# Patient Record
Sex: FEMALE | Race: WHITE | NOT HISPANIC OR LATINO | Employment: OTHER | URBAN - NONMETROPOLITAN AREA
[De-identification: names, ages, dates, MRNs, and addresses within clinical notes are randomized per-mention and may not be internally consistent; named-entity substitution may affect disease eponyms.]

---

## 2017-03-16 ENCOUNTER — OFFICE VISIT (OUTPATIENT)
Dept: RETAIL CLINIC | Facility: CLINIC | Age: 37
End: 2017-03-16

## 2017-03-16 VITALS
RESPIRATION RATE: 20 BRPM | SYSTOLIC BLOOD PRESSURE: 106 MMHG | TEMPERATURE: 98.9 F | HEART RATE: 102 BPM | DIASTOLIC BLOOD PRESSURE: 70 MMHG

## 2017-03-16 DIAGNOSIS — N30.01 ACUTE CYSTITIS WITH HEMATURIA: Primary | ICD-10-CM

## 2017-03-16 LAB
BILIRUB BLD-MCNC: NEGATIVE MG/DL
CLARITY, POC: CLEAR
COLOR UR: YELLOW
GLUCOSE UR STRIP-MCNC: ABNORMAL MG/DL
KETONES UR QL: ABNORMAL
LEUKOCYTE EST, POC: NEGATIVE
NITRITE UR-MCNC: NEGATIVE MG/ML
PH UR: 5 [PH] (ref 5–8)
PROT UR STRIP-MCNC: ABNORMAL MG/DL
RBC # UR STRIP: ABNORMAL /UL
SP GR UR: 1.01 (ref 1–1.03)
UROBILINOGEN UR QL: NORMAL

## 2017-03-16 PROCEDURE — 81002 URINALYSIS NONAUTO W/O SCOPE: CPT | Performed by: NURSE PRACTITIONER

## 2017-03-16 PROCEDURE — 99213 OFFICE O/P EST LOW 20 MIN: CPT | Performed by: NURSE PRACTITIONER

## 2017-03-16 RX ORDER — TRAMADOL HYDROCHLORIDE 50 MG/1
50 TABLET ORAL EVERY 6 HOURS PRN
COMMUNITY

## 2017-03-16 RX ORDER — CIPROFLOXACIN 250 MG/1
250 TABLET, FILM COATED ORAL 2 TIMES DAILY
Qty: 14 TABLET | Refills: 0 | Status: SHIPPED | OUTPATIENT
Start: 2017-03-16 | End: 2018-01-29

## 2017-03-16 NOTE — PATIENT INSTRUCTIONS
Increase fluids and rest.  If symptoms worsen or change, need to follow up with Urgent Care over the weekend.

## 2017-03-17 NOTE — PROGRESS NOTES
Subjective   Misa Lazcano is a 36 y.o. female here for UTI symptoms.     Urinary Tract Infection    This is a new problem. The current episode started today. The problem occurs every urination. The problem has been unchanged. The quality of the pain is described as burning (She is having pressure to urinate and burning with urination.  In addition, she has low to mid back pain bilaterally (aching)). The pain is mild. The maximum temperature recorded prior to her arrival was 100 - 100.9 F. The fever has been present for 3 - 4 days. Associated symptoms include chills, flank pain, frequency, hematuria, hesitancy, nausea and urgency. Pertinent negatives include no discharge, possible pregnancy, sweats or vomiting. Treatments tried: trying to push fluids. The treatment provided mild relief. There is no history of catheterization or recurrent UTIs.       The following portions of the patient's history were reviewed and updated as appropriate: allergies, current medications, past family history, past medical history, past social history, past surgical history and problem list.    Review of Systems   Constitutional: Positive for chills and fever (for a few days ). Negative for activity change.   HENT: Negative for congestion, mouth sores, nosebleeds, postnasal drip, rhinorrhea and sore throat.    Eyes: Negative for discharge and redness.   Respiratory: Negative for cough.    Gastrointestinal: Positive for abdominal pain (pressure over bladder) and nausea. Negative for blood in stool, constipation, diarrhea and vomiting.   Genitourinary: Positive for difficulty urinating, dysuria, flank pain, frequency, hematuria, hesitancy and urgency. Negative for vaginal bleeding and vaginal pain.        Hx of HAYLEY.   Musculoskeletal: Negative for arthralgias, joint swelling and myalgias.   Skin: Negative for rash.   Allergic/Immunologic: Negative for environmental allergies, food allergies and immunocompromised state.   Neurological:  Negative for seizures.   Hematological: Negative for adenopathy.       Objective   Physical Exam   Constitutional: She is oriented to person, place, and time. She appears well-developed and well-nourished. No distress.   HENT:   Head: Normocephalic and atraumatic.   Right Ear: External ear normal.   Left Ear: External ear normal.   Nose: Nose normal.   Mouth/Throat: Oropharynx is clear and moist. No oropharyngeal exudate.   Eyes: Conjunctivae and EOM are normal. Pupils are equal, round, and reactive to light. Right eye exhibits no discharge. Left eye exhibits no discharge. No scleral icterus.   Neck: Normal range of motion. Neck supple. No JVD present. No tracheal deviation present. No thyromegaly present.   Cardiovascular: Normal rate and regular rhythm.  Exam reveals no gallop and no friction rub.    No murmur heard.  Pulmonary/Chest: Effort normal and breath sounds normal. No stridor. No respiratory distress. She has no wheezes. She has no rales.   Abdominal: Soft. She exhibits no distension and no mass. There is tenderness (suprapubic tenderness and CVA tenderness bilaterally). There is no rebound and no guarding. No hernia.   Musculoskeletal: Normal range of motion. She exhibits no edema, tenderness or deformity.   Lymphadenopathy:     She has no cervical adenopathy.   Neurological: She is alert and oriented to person, place, and time. She exhibits normal muscle tone. Coordination normal.   Skin: Skin is warm and dry. No rash noted. She is not diaphoretic. No erythema. No pallor.   Psychiatric: She has a normal mood and affect. Her behavior is normal. Judgment and thought content normal.   Nursing note and vitals reviewed.      Assessment/Plan   Misa was seen today for urinary tract infection.    Diagnoses and all orders for this visit:    Acute cystitis with hematuria  -     ciprofloxacin (CIPRO) 250 MG tablet; Take 1 tablet by mouth 2 (Two) Times a Day.  -     POC Urinalysis Dipstick       Urine dipstick  was fairly unremarkable.  Reviewed recent blood glucose levels with her due to glucosuria with mild ketonuria.  She has been running 150-range with FBS and not over 200 non-fasting.  This is not a first-morning urine specimen, and due to the fact that she has classic UTI sx with CVA tenderness and fever, I am treating for UTI.  She understands to FU in 24 hours if symptoms are not improving, or go to Urgent Care/ER if symptoms worsen over the weekend.

## 2017-05-12 ENCOUNTER — APPOINTMENT (OUTPATIENT)
Dept: GENERAL RADIOLOGY | Age: 37
DRG: 638 | End: 2017-05-12
Payer: COMMERCIAL

## 2017-05-12 ENCOUNTER — HOSPITAL ENCOUNTER (INPATIENT)
Age: 37
LOS: 1 days | Discharge: HOME | DRG: 638 | End: 2017-05-13
Attending: EMERGENCY MEDICINE | Admitting: FAMILY MEDICINE
Payer: COMMERCIAL

## 2017-05-12 DIAGNOSIS — E10.10 DIABETIC KETOACIDOSIS WITHOUT COMA ASSOCIATED WITH TYPE 1 DIABETES MELLITUS (HCC): Primary | ICD-10-CM

## 2017-05-12 LAB
ACETONE BLOOD: ABNORMAL
ALBUMIN SERPL-MCNC: 4.5 G/DL (ref 3.5–5.2)
ALP BLD-CCNC: 68 U/L (ref 35–104)
ALT SERPL-CCNC: 9 U/L (ref 5–33)
ANION GAP SERPL CALCULATED.3IONS-SCNC: 13 MMOL/L (ref 7–19)
ANION GAP SERPL CALCULATED.3IONS-SCNC: 16 MMOL/L (ref 7–19)
ANION GAP SERPL CALCULATED.3IONS-SCNC: 18 MMOL/L (ref 7–19)
ANION GAP SERPL CALCULATED.3IONS-SCNC: 28 MMOL/L (ref 7–19)
AST SERPL-CCNC: 10 U/L (ref 5–32)
BASE EXCESS VENOUS: -9 MMOL/L
BASOPHILS ABSOLUTE: 0 K/UL (ref 0–0.2)
BASOPHILS RELATIVE PERCENT: 0.5 % (ref 0–1)
BILIRUB SERPL-MCNC: 0.8 MG/DL (ref 0.2–1.2)
BILIRUBIN URINE: NEGATIVE
BLOOD, URINE: NEGATIVE
BUN BLDV-MCNC: 13 MG/DL (ref 6–20)
BUN BLDV-MCNC: 16 MG/DL (ref 6–20)
BUN BLDV-MCNC: 16 MG/DL (ref 6–20)
BUN BLDV-MCNC: 8 MG/DL (ref 6–20)
CALCIUM SERPL-MCNC: 7.8 MG/DL (ref 8.6–10)
CALCIUM SERPL-MCNC: 8 MG/DL (ref 8.6–10)
CALCIUM SERPL-MCNC: 8.1 MG/DL (ref 8.6–10)
CALCIUM SERPL-MCNC: 9.3 MG/DL (ref 8.6–10)
CARBOXYHEMOGLOBIN: 5.5 %
CHLORIDE BLD-SCNC: 102 MMOL/L (ref 98–111)
CHLORIDE BLD-SCNC: 104 MMOL/L (ref 98–111)
CHLORIDE BLD-SCNC: 105 MMOL/L (ref 98–111)
CHLORIDE BLD-SCNC: 94 MMOL/L (ref 98–111)
CLARITY: CLEAR
CO2: 16 MMOL/L (ref 22–29)
CO2: 16 MMOL/L (ref 22–29)
CO2: 20 MMOL/L (ref 22–29)
CO2: 20 MMOL/L (ref 22–29)
COLOR: YELLOW
CREAT SERPL-MCNC: 0.5 MG/DL (ref 0.5–0.9)
CREAT SERPL-MCNC: 0.6 MG/DL (ref 0.5–0.9)
CREAT SERPL-MCNC: 0.6 MG/DL (ref 0.5–0.9)
CREAT SERPL-MCNC: 0.7 MG/DL (ref 0.5–0.9)
EOSINOPHILS ABSOLUTE: 0.1 K/UL (ref 0–0.6)
EOSINOPHILS RELATIVE PERCENT: 0.7 % (ref 0–5)
GFR NON-AFRICAN AMERICAN: >60
GLOBULIN: 3.1 G/DL
GLUCOSE BLD-MCNC: 109 MG/DL (ref 70–99)
GLUCOSE BLD-MCNC: 110 MG/DL (ref 70–99)
GLUCOSE BLD-MCNC: 110 MG/DL (ref 74–109)
GLUCOSE BLD-MCNC: 111 MG/DL (ref 70–99)
GLUCOSE BLD-MCNC: 115 MG/DL (ref 70–99)
GLUCOSE BLD-MCNC: 118 MG/DL (ref 74–109)
GLUCOSE BLD-MCNC: 130 MG/DL (ref 70–99)
GLUCOSE BLD-MCNC: 136 MG/DL (ref 70–99)
GLUCOSE BLD-MCNC: 142 MG/DL (ref 70–99)
GLUCOSE BLD-MCNC: 154 MG/DL (ref 70–99)
GLUCOSE BLD-MCNC: 163 MG/DL (ref 74–109)
GLUCOSE BLD-MCNC: 185 MG/DL (ref 70–99)
GLUCOSE BLD-MCNC: 190 MG/DL (ref 70–99)
GLUCOSE BLD-MCNC: 221 MG/DL (ref 70–99)
GLUCOSE BLD-MCNC: 241 MG/DL (ref 70–99)
GLUCOSE BLD-MCNC: 303 MG/DL (ref 70–99)
GLUCOSE BLD-MCNC: 342 MG/DL (ref 74–109)
GLUCOSE BLD-MCNC: 42 MG/DL (ref 70–99)
GLUCOSE BLD-MCNC: 53 MG/DL (ref 70–99)
GLUCOSE BLD-MCNC: 99 MG/DL (ref 70–99)
GLUCOSE URINE: >=1000 MG/DL
HCG QUALITATIVE: NEGATIVE
HCO3 VENOUS: 18 MMOL/L (ref 23–29)
HCT VFR BLD CALC: 34.8 % (ref 37–47)
HCT VFR BLD CALC: 44.4 % (ref 37–47)
HEMOGLOBIN: 12.2 G/DL (ref 12–16)
HEMOGLOBIN: 15.1 G/DL (ref 12–16)
KETONES, URINE: >=160 MG/DL
LEUKOCYTE ESTERASE, URINE: NEGATIVE
LYMPHOCYTES ABSOLUTE: 1 K/UL (ref 1.1–4.5)
LYMPHOCYTES RELATIVE PERCENT: 14.2 % (ref 20–40)
MAGNESIUM: 1.8 MG/DL (ref 1.6–2.6)
MCH RBC QN AUTO: 31 PG (ref 27–31)
MCH RBC QN AUTO: 31.4 PG (ref 27–31)
MCHC RBC AUTO-ENTMCNC: 34 G/DL (ref 33–37)
MCHC RBC AUTO-ENTMCNC: 35.1 G/DL (ref 33–37)
MCV RBC AUTO: 89.7 FL (ref 81–99)
MCV RBC AUTO: 91.2 FL (ref 81–99)
MONOCYTES ABSOLUTE: 0.3 K/UL (ref 0–0.9)
MONOCYTES RELATIVE PERCENT: 4.6 % (ref 0–10)
NEUTROPHILS ABSOLUTE: 5.8 K/UL (ref 1.5–7.5)
NEUTROPHILS RELATIVE PERCENT: 79.6 % (ref 50–65)
NITRITE, URINE: NEGATIVE
O2 CONTENT, VEN: 12 ML/DL
O2 SAT, VEN: 57 %
PCO2, VEN: 42 MMHG (ref 40–50)
PDW BLD-RTO: 12.3 % (ref 11.5–14.5)
PDW BLD-RTO: 12.4 % (ref 11.5–14.5)
PERFORMED ON: ABNORMAL
PERFORMED ON: NORMAL
PH UA: 5.5
PH VENOUS: 7.25 (ref 7.35–7.45)
PHOSPHORUS: 2.6 MG/DL (ref 2.5–4.5)
PLATELET # BLD: 251 K/UL (ref 130–400)
PLATELET # BLD: 267 K/UL (ref 130–400)
PMV BLD AUTO: 10.1 FL (ref 7.4–10.4)
PMV BLD AUTO: 9.2 FL (ref 7.4–10.4)
PO2, VEN: 31 MMHG
POTASSIUM SERPL-SCNC: 3.5 MMOL/L (ref 3.5–5)
POTASSIUM SERPL-SCNC: 3.6 MMOL/L (ref 3.5–5)
POTASSIUM SERPL-SCNC: 3.8 MMOL/L (ref 3.5–5)
POTASSIUM SERPL-SCNC: 5.1 MMOL/L (ref 3.5–5)
PROTEIN UA: NEGATIVE MG/DL
RBC # BLD: 3.88 M/UL (ref 4.2–5.4)
RBC # BLD: 4.87 M/UL (ref 4.2–5.4)
SODIUM BLD-SCNC: 136 MMOL/L (ref 136–145)
SODIUM BLD-SCNC: 138 MMOL/L (ref 136–145)
SODIUM BLD-SCNC: 138 MMOL/L (ref 136–145)
SODIUM BLD-SCNC: 140 MMOL/L (ref 136–145)
SPECIFIC GRAVITY UA: 1.03
TOTAL PROTEIN: 7.6 G/DL (ref 6.6–8.7)
UROBILINOGEN, URINE: 0.2 E.U./DL
WBC # BLD: 7.3 K/UL (ref 4.8–10.8)
WBC # BLD: 8.6 K/UL (ref 4.8–10.8)

## 2017-05-12 PROCEDURE — 99285 EMERGENCY DEPT VISIT HI MDM: CPT

## 2017-05-12 PROCEDURE — 2580000003 HC RX 258

## 2017-05-12 PROCEDURE — 2580000003 HC RX 258: Performed by: FAMILY MEDICINE

## 2017-05-12 PROCEDURE — 2580000003 HC RX 258: Performed by: NURSE PRACTITIONER

## 2017-05-12 PROCEDURE — 99285 EMERGENCY DEPT VISIT HI MDM: CPT | Performed by: EMERGENCY MEDICINE

## 2017-05-12 PROCEDURE — 36600 WITHDRAWAL OF ARTERIAL BLOOD: CPT

## 2017-05-12 PROCEDURE — 6360000002 HC RX W HCPCS: Performed by: NURSE PRACTITIONER

## 2017-05-12 PROCEDURE — 2000000000 HC ICU R&B

## 2017-05-12 PROCEDURE — 85025 COMPLETE CBC W/AUTO DIFF WBC: CPT

## 2017-05-12 PROCEDURE — 6370000000 HC RX 637 (ALT 250 FOR IP): Performed by: EMERGENCY MEDICINE

## 2017-05-12 PROCEDURE — 82948 REAGENT STRIP/BLOOD GLUCOSE: CPT

## 2017-05-12 PROCEDURE — 80048 BASIC METABOLIC PNL TOTAL CA: CPT

## 2017-05-12 PROCEDURE — 6370000000 HC RX 637 (ALT 250 FOR IP): Performed by: FAMILY MEDICINE

## 2017-05-12 PROCEDURE — 85027 COMPLETE CBC AUTOMATED: CPT

## 2017-05-12 PROCEDURE — 87070 CULTURE OTHR SPECIMN AEROBIC: CPT

## 2017-05-12 PROCEDURE — 87040 BLOOD CULTURE FOR BACTERIA: CPT

## 2017-05-12 PROCEDURE — 2580000003 HC RX 258: Performed by: EMERGENCY MEDICINE

## 2017-05-12 PROCEDURE — 96374 THER/PROPH/DIAG INJ IV PUSH: CPT

## 2017-05-12 PROCEDURE — 82803 BLOOD GASES ANY COMBINATION: CPT

## 2017-05-12 PROCEDURE — 84703 CHORIONIC GONADOTROPIN ASSAY: CPT

## 2017-05-12 PROCEDURE — 80053 COMPREHEN METABOLIC PANEL: CPT

## 2017-05-12 PROCEDURE — 36415 COLL VENOUS BLD VENIPUNCTURE: CPT

## 2017-05-12 PROCEDURE — 82009 KETONE BODYS QUAL: CPT

## 2017-05-12 PROCEDURE — 84100 ASSAY OF PHOSPHORUS: CPT

## 2017-05-12 PROCEDURE — 81003 URINALYSIS AUTO W/O SCOPE: CPT

## 2017-05-12 PROCEDURE — 71010 XR CHEST PORTABLE: CPT

## 2017-05-12 PROCEDURE — 6360000002 HC RX W HCPCS: Performed by: FAMILY MEDICINE

## 2017-05-12 PROCEDURE — 83735 ASSAY OF MAGNESIUM: CPT

## 2017-05-12 RX ORDER — SODIUM CHLORIDE 9 MG/ML
INJECTION, SOLUTION INTRAVENOUS CONTINUOUS
Status: DISCONTINUED | OUTPATIENT
Start: 2017-05-12 | End: 2017-05-13 | Stop reason: HOSPADM

## 2017-05-12 RX ORDER — ONDANSETRON 2 MG/ML
4 INJECTION INTRAMUSCULAR; INTRAVENOUS EVERY 6 HOURS PRN
Status: DISCONTINUED | OUTPATIENT
Start: 2017-05-12 | End: 2017-05-13 | Stop reason: HOSPADM

## 2017-05-12 RX ORDER — PROMETHAZINE HYDROCHLORIDE 25 MG/ML
12.5 INJECTION, SOLUTION INTRAMUSCULAR; INTRAVENOUS EVERY 6 HOURS PRN
Status: DISCONTINUED | OUTPATIENT
Start: 2017-05-12 | End: 2017-05-13 | Stop reason: HOSPADM

## 2017-05-12 RX ORDER — TRAMADOL HYDROCHLORIDE 50 MG/1
50 TABLET ORAL EVERY 6 HOURS PRN
Status: DISCONTINUED | OUTPATIENT
Start: 2017-05-12 | End: 2017-05-13 | Stop reason: HOSPADM

## 2017-05-12 RX ORDER — SODIUM CHLORIDE 0.9 % (FLUSH) 0.9 %
10 SYRINGE (ML) INJECTION EVERY 12 HOURS SCHEDULED
Status: DISCONTINUED | OUTPATIENT
Start: 2017-05-12 | End: 2017-05-13 | Stop reason: HOSPADM

## 2017-05-12 RX ORDER — DEXTROSE MONOHYDRATE 50 MG/ML
100 INJECTION, SOLUTION INTRAVENOUS PRN
Status: DISCONTINUED | OUTPATIENT
Start: 2017-05-12 | End: 2017-05-13 | Stop reason: HOSPADM

## 2017-05-12 RX ORDER — 0.9 % SODIUM CHLORIDE 0.9 %
1000 INTRAVENOUS SOLUTION INTRAVENOUS ONCE
Status: COMPLETED | OUTPATIENT
Start: 2017-05-12 | End: 2017-05-12

## 2017-05-12 RX ORDER — SODIUM CHLORIDE 0.9 % (FLUSH) 0.9 %
10 SYRINGE (ML) INJECTION PRN
Status: DISCONTINUED | OUTPATIENT
Start: 2017-05-12 | End: 2017-05-13 | Stop reason: HOSPADM

## 2017-05-12 RX ORDER — PROMETHAZINE HYDROCHLORIDE 25 MG/ML
12.5 INJECTION, SOLUTION INTRAMUSCULAR; INTRAVENOUS ONCE
Status: COMPLETED | OUTPATIENT
Start: 2017-05-12 | End: 2017-05-12

## 2017-05-12 RX ORDER — ACETAMINOPHEN 325 MG/1
650 TABLET ORAL EVERY 4 HOURS PRN
Status: DISCONTINUED | OUTPATIENT
Start: 2017-05-12 | End: 2017-05-13 | Stop reason: HOSPADM

## 2017-05-12 RX ORDER — CLONAZEPAM 1 MG/1
1 TABLET ORAL 3 TIMES DAILY
Status: DISCONTINUED | OUTPATIENT
Start: 2017-05-12 | End: 2017-05-13 | Stop reason: HOSPADM

## 2017-05-12 RX ORDER — NICOTINE POLACRILEX 4 MG
15 LOZENGE BUCCAL PRN
Status: DISCONTINUED | OUTPATIENT
Start: 2017-05-12 | End: 2017-05-13

## 2017-05-12 RX ORDER — DEXTROSE MONOHYDRATE 25 G/50ML
INJECTION, SOLUTION INTRAVENOUS
Status: COMPLETED
Start: 2017-05-12 | End: 2017-05-12

## 2017-05-12 RX ORDER — TRAMADOL HYDROCHLORIDE 50 MG/1
50 TABLET ORAL EVERY 6 HOURS PRN
COMMUNITY

## 2017-05-12 RX ORDER — DEXTROSE MONOHYDRATE 25 G/50ML
12.5 INJECTION, SOLUTION INTRAVENOUS PRN
Status: DISCONTINUED | OUTPATIENT
Start: 2017-05-12 | End: 2017-05-13 | Stop reason: HOSPADM

## 2017-05-12 RX ADMIN — SODIUM CHLORIDE 0.1 UNITS/KG/HR: 9 INJECTION, SOLUTION INTRAVENOUS at 09:18

## 2017-05-12 RX ADMIN — SODIUM CHLORIDE 1000 ML: 9 INJECTION, SOLUTION INTRAVENOUS at 07:36

## 2017-05-12 RX ADMIN — ACETAMINOPHEN 650 MG: 325 TABLET, FILM COATED ORAL at 21:30

## 2017-05-12 RX ADMIN — DEXTROSE MONOHYDRATE 100 ML/HR: 50 INJECTION, SOLUTION INTRAVENOUS at 21:15

## 2017-05-12 RX ADMIN — SODIUM CHLORIDE: 9 INJECTION, SOLUTION INTRAVENOUS at 10:41

## 2017-05-12 RX ADMIN — CLONAZEPAM 1 MG: 1 TABLET ORAL at 20:17

## 2017-05-12 RX ADMIN — DEXTROSE MONOHYDRATE 12.5 G: 25 INJECTION, SOLUTION INTRAVENOUS at 21:05

## 2017-05-12 RX ADMIN — PROMETHAZINE HYDROCHLORIDE 12.5 MG: 25 INJECTION, SOLUTION INTRAMUSCULAR; INTRAVENOUS at 17:58

## 2017-05-12 RX ADMIN — DEXTROSE MONOHYDRATE 50 ML: 25 INJECTION, SOLUTION INTRAVENOUS at 14:59

## 2017-05-12 RX ADMIN — PROMETHAZINE HYDROCHLORIDE 12.5 MG: 25 INJECTION INTRAMUSCULAR; INTRAVENOUS at 07:37

## 2017-05-12 RX ADMIN — TRAMADOL HYDROCHLORIDE 50 MG: 50 TABLET, FILM COATED ORAL at 20:17

## 2017-05-12 RX ADMIN — Medication 10 ML: at 10:41

## 2017-05-12 RX ADMIN — Medication 10 ML: at 21:30

## 2017-05-12 RX ADMIN — CLONAZEPAM 1 MG: 1 TABLET ORAL at 14:59

## 2017-05-12 RX ADMIN — SODIUM CHLORIDE 1000 ML: 9 INJECTION, SOLUTION INTRAVENOUS at 09:18

## 2017-05-12 RX ADMIN — ENOXAPARIN SODIUM 40 MG: 40 INJECTION SUBCUTANEOUS at 14:59

## 2017-05-12 ASSESSMENT — ENCOUNTER SYMPTOMS
VOMITING: 0
NAUSEA: 1
WHEEZING: 0
DIARRHEA: 1
SHORTNESS OF BREATH: 0
EYE DISCHARGE: 0
COUGH: 0
SORE THROAT: 0
BACK PAIN: 0
ABDOMINAL PAIN: 0

## 2017-05-12 ASSESSMENT — PAIN SCALES - GENERAL
PAINLEVEL_OUTOF10: 6
PAINLEVEL_OUTOF10: 2

## 2017-05-13 VITALS
RESPIRATION RATE: 24 BRPM | DIASTOLIC BLOOD PRESSURE: 69 MMHG | HEIGHT: 66 IN | BODY MASS INDEX: 15.46 KG/M2 | TEMPERATURE: 98.4 F | SYSTOLIC BLOOD PRESSURE: 108 MMHG | WEIGHT: 96.2 LBS | HEART RATE: 79 BPM | OXYGEN SATURATION: 99 %

## 2017-05-13 LAB
ANION GAP SERPL CALCULATED.3IONS-SCNC: 11 MMOL/L (ref 7–19)
BUN BLDV-MCNC: 6 MG/DL (ref 6–20)
CALCIUM SERPL-MCNC: 7.8 MG/DL (ref 8.6–10)
CHLORIDE BLD-SCNC: 103 MMOL/L (ref 98–111)
CO2: 20 MMOL/L (ref 22–29)
CREAT SERPL-MCNC: 0.5 MG/DL (ref 0.5–0.9)
GFR NON-AFRICAN AMERICAN: >60
GLUCOSE BLD-MCNC: 108 MG/DL (ref 70–99)
GLUCOSE BLD-MCNC: 126 MG/DL (ref 70–99)
GLUCOSE BLD-MCNC: 129 MG/DL (ref 70–99)
GLUCOSE BLD-MCNC: 141 MG/DL (ref 70–99)
GLUCOSE BLD-MCNC: 151 MG/DL (ref 70–99)
GLUCOSE BLD-MCNC: 157 MG/DL (ref 70–99)
GLUCOSE BLD-MCNC: 158 MG/DL (ref 70–99)
GLUCOSE BLD-MCNC: 159 MG/DL (ref 74–109)
GLUCOSE BLD-MCNC: 180 MG/DL (ref 70–99)
GLUCOSE BLD-MCNC: 331 MG/DL (ref 70–99)
HBA1C MFR BLD: 11 %
MRSA CULTURE ONLY: ABNORMAL
ORGANISM: ABNORMAL
PERFORMED ON: ABNORMAL
POTASSIUM SERPL-SCNC: 3.2 MMOL/L (ref 3.5–5)
SODIUM BLD-SCNC: 134 MMOL/L (ref 136–145)

## 2017-05-13 PROCEDURE — 80048 BASIC METABOLIC PNL TOTAL CA: CPT

## 2017-05-13 PROCEDURE — 6370000000 HC RX 637 (ALT 250 FOR IP): Performed by: FAMILY MEDICINE

## 2017-05-13 PROCEDURE — 2580000003 HC RX 258: Performed by: FAMILY MEDICINE

## 2017-05-13 PROCEDURE — 83036 HEMOGLOBIN GLYCOSYLATED A1C: CPT

## 2017-05-13 PROCEDURE — 6370000000 HC RX 637 (ALT 250 FOR IP): Performed by: INTERNAL MEDICINE

## 2017-05-13 PROCEDURE — 36415 COLL VENOUS BLD VENIPUNCTURE: CPT

## 2017-05-13 PROCEDURE — 82948 REAGENT STRIP/BLOOD GLUCOSE: CPT

## 2017-05-13 RX ORDER — NICOTINE POLACRILEX 4 MG
15 LOZENGE BUCCAL PRN
Status: DISCONTINUED | OUTPATIENT
Start: 2017-05-13 | End: 2017-05-13 | Stop reason: HOSPADM

## 2017-05-13 RX ORDER — DEXTROSE MONOHYDRATE 50 MG/ML
100 INJECTION, SOLUTION INTRAVENOUS PRN
Status: DISCONTINUED | OUTPATIENT
Start: 2017-05-13 | End: 2017-05-13 | Stop reason: HOSPADM

## 2017-05-13 RX ORDER — DEXTROSE MONOHYDRATE 25 G/50ML
12.5 INJECTION, SOLUTION INTRAVENOUS PRN
Status: DISCONTINUED | OUTPATIENT
Start: 2017-05-13 | End: 2017-05-13 | Stop reason: HOSPADM

## 2017-05-13 RX ORDER — INSULIN GLARGINE 100 [IU]/ML
30 INJECTION, SOLUTION SUBCUTANEOUS DAILY
Status: DISCONTINUED | OUTPATIENT
Start: 2017-05-13 | End: 2017-05-13 | Stop reason: HOSPADM

## 2017-05-13 RX ADMIN — TRAMADOL HYDROCHLORIDE 50 MG: 50 TABLET, FILM COATED ORAL at 08:19

## 2017-05-13 RX ADMIN — SODIUM CHLORIDE: 9 INJECTION, SOLUTION INTRAVENOUS at 08:24

## 2017-05-13 RX ADMIN — CLONAZEPAM 1 MG: 1 TABLET ORAL at 08:15

## 2017-05-13 RX ADMIN — INSULIN GLARGINE 30 UNITS: 100 INJECTION, SOLUTION SUBCUTANEOUS at 10:14

## 2017-05-13 RX ADMIN — INSULIN LISPRO 8 UNITS: 100 INJECTION, SOLUTION INTRAVENOUS; SUBCUTANEOUS at 12:32

## 2017-05-13 RX ADMIN — INSULIN LISPRO 4 UNITS: 100 INJECTION, SOLUTION INTRAVENOUS; SUBCUTANEOUS at 12:31

## 2017-05-13 ASSESSMENT — PAIN SCALES - GENERAL
PAINLEVEL_OUTOF10: 2
PAINLEVEL_OUTOF10: 5

## 2017-05-18 LAB
BLOOD CULTURE, ROUTINE: NORMAL
CULTURE, BLOOD 2: NORMAL

## 2017-10-12 ENCOUNTER — TELEPHONE (OUTPATIENT)
Dept: NEUROLOGY | Age: 37
End: 2017-10-12

## 2018-01-29 ENCOUNTER — OFFICE VISIT (OUTPATIENT)
Dept: RETAIL CLINIC | Facility: CLINIC | Age: 38
End: 2018-01-29

## 2018-01-29 VITALS
RESPIRATION RATE: 20 BRPM | SYSTOLIC BLOOD PRESSURE: 109 MMHG | DIASTOLIC BLOOD PRESSURE: 80 MMHG | HEART RATE: 95 BPM | TEMPERATURE: 98.7 F

## 2018-01-29 DIAGNOSIS — B02.9 HERPES ZOSTER WITHOUT COMPLICATION: Primary | ICD-10-CM

## 2018-01-29 PROCEDURE — 99213 OFFICE O/P EST LOW 20 MIN: CPT | Performed by: NURSE PRACTITIONER

## 2018-01-29 RX ORDER — FAMCICLOVIR 500 MG/1
500 TABLET ORAL 3 TIMES DAILY
Qty: 21 TABLET | Refills: 0 | Status: SHIPPED | OUTPATIENT
Start: 2018-01-29 | End: 2018-02-05

## 2018-01-29 RX ORDER — ONDANSETRON 8 MG/1
8 TABLET, ORALLY DISINTEGRATING ORAL EVERY 8 HOURS PRN
Qty: 12 TABLET | Refills: 0 | Status: SHIPPED | OUTPATIENT
Start: 2018-01-29 | End: 2020-12-02

## 2018-01-29 NOTE — PROGRESS NOTES
Subjective   Misa Martin is a 37 y.o. female here for rash.     Rash   This is a new problem. The current episode started yesterday. The problem has been rapidly worsening since onset. Location: right flank. The rash is characterized by redness, burning and itchiness (bumps). She was exposed to nothing. Associated symptoms include coughing (slight). Pertinent negatives include no anorexia, congestion, diarrhea, eye pain, facial edema, fatigue, fever, joint pain, nail changes, rhinorrhea, shortness of breath, sore throat or vomiting. Past treatments include nothing. Her past medical history is significant for varicella. There is no history of allergies, asthma or eczema.       The following portions of the patient's history were reviewed and updated as appropriate: allergies, current medications, past family history, past medical history, past social history, past surgical history and problem list.    Review of Systems   Constitutional: Negative for activity change, appetite change, fatigue and fever.   HENT: Negative for congestion, rhinorrhea and sore throat.    Eyes: Negative for pain and discharge.   Respiratory: Positive for cough (slight). Negative for chest tightness and shortness of breath.    Gastrointestinal: Positive for abdominal pain and nausea. Negative for anorexia, diarrhea and vomiting.   Genitourinary: Negative for dysuria.   Musculoskeletal: Positive for back pain (hurts mid-back all along side, to RUQ abdomen) and neck pain (feels achy). Negative for arthralgias, joint pain and joint swelling.   Skin: Positive for rash. Negative for color change, nail changes and pallor.   Allergic/Immunologic: Negative for environmental allergies, food allergies and immunocompromised state.   Neurological: Negative for seizures.   Hematological: Negative for adenopathy.       Objective   Physical Exam   Constitutional: She is oriented to person, place, and time. She appears well-developed and well-nourished. No  distress.   HENT:   Head: Normocephalic and atraumatic.   Right Ear: External ear normal.   Left Ear: External ear normal.   Nose: Nose normal.   Mouth/Throat: Oropharynx is clear and moist. No oropharyngeal exudate.   Eyes: Conjunctivae and EOM are normal. Pupils are equal, round, and reactive to light. Right eye exhibits no discharge. Left eye exhibits no discharge. No scleral icterus.   Neck: Normal range of motion. Neck supple. No JVD present. No tracheal deviation present. No thyromegaly present.   Cardiovascular: Normal rate and regular rhythm.  Exam reveals no gallop and no friction rub.    No murmur heard.  Pulmonary/Chest: Effort normal and breath sounds normal. No stridor. No respiratory distress. She has no wheezes. She has no rales.   Abdominal: Soft. She exhibits no distension and no mass. There is tenderness (RUQ and rib cage). There is no rebound and no guarding. No hernia.   Musculoskeletal: She exhibits no edema, tenderness or deformity.   Lymphadenopathy:     She has no cervical adenopathy.   Neurological: She is alert and oriented to person, place, and time. She exhibits normal muscle tone. Coordination normal.   Skin: Skin is warm and dry. Rash (cluster of papules on erythematous base at level of last rib right flank region) noted. She is not diaphoretic. No erythema. No pallor.   Psychiatric: She has a normal mood and affect. Her behavior is normal. Judgment and thought content normal.   Nursing note and vitals reviewed.      Assessment/Plan   Misa was seen today for rash.    Diagnoses and all orders for this visit:    Herpes zoster without complication    Other orders  -     famciclovir (FAMVIR) 500 MG tablet; Take 1 tablet by mouth 3 (Three) Times a Day for 7 days.  -     ondansetron ODT (ZOFRAN ODT) 8 MG disintegrating tablet; Take 1 tablet by mouth Every 8 (Eight) Hours As Needed for Nausea or Vomiting.

## 2018-02-26 ENCOUNTER — HOSPITAL ENCOUNTER (EMERGENCY)
Age: 38
Discharge: HOME | End: 2018-02-26
Payer: MEDICARE

## 2018-02-26 ENCOUNTER — APPOINTMENT (OUTPATIENT)
Dept: GENERAL RADIOLOGY | Age: 38
End: 2018-02-26
Payer: MEDICARE

## 2018-02-26 VITALS
BODY MASS INDEX: 16.07 KG/M2 | RESPIRATION RATE: 14 BRPM | OXYGEN SATURATION: 97 % | DIASTOLIC BLOOD PRESSURE: 79 MMHG | WEIGHT: 100 LBS | HEART RATE: 89 BPM | SYSTOLIC BLOOD PRESSURE: 111 MMHG | HEIGHT: 66 IN | TEMPERATURE: 98 F

## 2018-02-26 DIAGNOSIS — S29.011A MUSCLE STRAIN OF CHEST WALL, INITIAL ENCOUNTER: Primary | ICD-10-CM

## 2018-02-26 PROCEDURE — 6360000002 HC RX W HCPCS

## 2018-02-26 PROCEDURE — 6360000002 HC RX W HCPCS: Performed by: NURSE PRACTITIONER

## 2018-02-26 PROCEDURE — 71046 X-RAY EXAM CHEST 2 VIEWS: CPT

## 2018-02-26 PROCEDURE — 96372 THER/PROPH/DIAG INJ SC/IM: CPT

## 2018-02-26 PROCEDURE — 99283 EMERGENCY DEPT VISIT LOW MDM: CPT | Performed by: NURSE PRACTITIONER

## 2018-02-26 PROCEDURE — 99284 EMERGENCY DEPT VISIT MOD MDM: CPT

## 2018-02-26 RX ORDER — KETOROLAC TROMETHAMINE 30 MG/ML
30 INJECTION, SOLUTION INTRAMUSCULAR; INTRAVENOUS ONCE
Status: COMPLETED | OUTPATIENT
Start: 2018-02-26 | End: 2018-02-26

## 2018-02-26 RX ORDER — NAPROXEN 500 MG/1
500 TABLET ORAL 2 TIMES DAILY
Qty: 20 TABLET | Refills: 0 | Status: SHIPPED | OUTPATIENT
Start: 2018-02-26 | End: 2019-09-23 | Stop reason: ALTCHOICE

## 2018-02-26 RX ORDER — ORPHENADRINE CITRATE 30 MG/ML
60 INJECTION INTRAMUSCULAR; INTRAVENOUS ONCE
Status: COMPLETED | OUTPATIENT
Start: 2018-02-26 | End: 2018-02-26

## 2018-02-26 RX ORDER — ONDANSETRON 4 MG/1
4 TABLET, ORALLY DISINTEGRATING ORAL ONCE
Status: COMPLETED | OUTPATIENT
Start: 2018-02-26 | End: 2018-02-26

## 2018-02-26 RX ORDER — CYCLOBENZAPRINE HCL 10 MG
10 TABLET ORAL 3 TIMES DAILY PRN
Qty: 20 TABLET | Refills: 0 | Status: SHIPPED | OUTPATIENT
Start: 2018-02-26 | End: 2019-09-23 | Stop reason: ALTCHOICE

## 2018-02-26 RX ORDER — ONDANSETRON 4 MG/1
TABLET, ORALLY DISINTEGRATING ORAL
Status: COMPLETED
Start: 2018-02-26 | End: 2018-02-26

## 2018-02-26 RX ADMIN — ORPHENADRINE CITRATE 60 MG: 30 INJECTION INTRAMUSCULAR; INTRAVENOUS at 08:44

## 2018-02-26 RX ADMIN — ONDANSETRON 4 MG: 4 TABLET, ORALLY DISINTEGRATING ORAL at 08:43

## 2018-02-26 RX ADMIN — KETOROLAC TROMETHAMINE 30 MG: 30 INJECTION, SOLUTION INTRAMUSCULAR at 08:44

## 2018-02-26 ASSESSMENT — ENCOUNTER SYMPTOMS
RESPIRATORY NEGATIVE: 1
GASTROINTESTINAL NEGATIVE: 1

## 2018-02-26 ASSESSMENT — PAIN SCALES - GENERAL: PAINLEVEL_OUTOF10: 10

## 2018-02-26 NOTE — ED PROVIDER NOTES
GLARGINE 300 UNIT/ML SOPN    Inject 24 Units into the skin every morning Indications: TOUJEO     INSULIN LISPRO (HUMALOG) 100 UNIT/ML INJECTION VIAL    Inject 0-6 Units into the skin 3 times daily (with meals)    INSULIN LISPRO (HUMALOG) 100 UNIT/ML INJECTION VIAL    Inject 0-3 Units into the skin nightly    INSULIN LISPRO (HUMALOG) 100 UNIT/ML INJECTION VIAL    Inject 4 Units into the skin 3 times daily (before meals) Indications: sliding scale - USUALLY 4 UNITS    TRAMADOL (ULTRAM) 50 MG TABLET    Take 50 mg by mouth every 6 hours as needed for Pain Indications: Pain       ALLERGIES     Ceclor [cefaclor]; Keflex [cephalexin]; and Penicillins    FAMILY HISTORY       Family History   Problem Relation Age of Onset    Cancer Paternal Aunt     Cancer Maternal Grandmother     Cancer Paternal Grandmother           SOCIAL HISTORY       Social History     Social History    Marital status:      Spouse name: N/A    Number of children: N/A    Years of education: N/A     Social History Main Topics    Smoking status: Current Every Day Smoker     Packs/day: 1.00     Years: 20.00     Types: Cigarettes    Smokeless tobacco: None    Alcohol use No    Drug use: No    Sexual activity: Yes     Partners: Male     Other Topics Concern    None     Social History Narrative    None       SCREENINGS             PHYSICAL EXAM    (up to 7 for level 4, 8 or more for level 5)     ED Triage Vitals [02/26/18 0736]   BP Temp Temp src Pulse Resp SpO2 Height Weight   111/79 98 °F (36.7 °C) -- 89 14 97 % 5' 6\" (1.676 m) 100 lb (45.4 kg)       Physical Exam   Constitutional: She is oriented to person, place, and time. She appears well-nourished. HENT:   Head: Normocephalic. Neck: Normal range of motion. Cardiovascular: Normal rate, regular rhythm, normal heart sounds and intact distal pulses.     Pulmonary/Chest: Effort normal and breath sounds normal. She exhibits tenderness (right upper anterior chest reproducing patient's known as:  FLEXERIL  Take 1 tablet by mouth 3 times daily as needed for Muscle spasms     naproxen 500 MG tablet  Commonly known as:  NAPROSYN  Take 1 tablet by mouth 2 times daily        STOP taking these medications    hydrOXYzine 10 MG tablet  Commonly known as:  ATARAX     ondansetron 4 MG disintegrating tablet  Commonly known as:  ZOFRAN ODT     ondansetron 4 MG tablet  Commonly known as:  ZOFRAN        ASK your doctor about these medications    B-D ULTRAFINE III SHORT PEN 31G X 8 MM Misc  Generic drug:  Insulin Pen Needle     clonazePAM 1 MG tablet  Commonly known as:  KLONOPIN     dicyclomine 10 MG capsule  Commonly known as:  BENTYL     insulin glargine 300 UNIT/ML injection pen  Commonly known as:  TOUJEO SOLOSTAR     * insulin lispro 100 UNIT/ML injection vial  Commonly known as:  HUMALOG  Inject 0-6 Units into the skin 3 times daily (with meals)     * insulin lispro 100 UNIT/ML injection vial  Commonly known as:  HUMALOG  Inject 0-3 Units into the skin nightly     * insulin lispro 100 UNIT/ML injection vial  Commonly known as:  HUMALOG  Inject 4 Units into the skin 3 times daily (before meals) Indications: sliding scale - USUALLY 4 UNITS     traMADol 50 MG tablet  Commonly known as:  ULTRAM        * This list has 3 medication(s) that are the same as other medications prescribed for you. Read the directions carefully, and ask your doctor or other care provider to review them with you.                Where to Get Your Medications      You can get these medications from any pharmacy    Bring a paper prescription for each of these medications  · cyclobenzaprine 10 MG tablet  · naproxen 500 MG tablet           (Please note that portions of this note were completed with a voice recognition program.  Efforts were made to edit the dictations but occasionally words are mis-transcribed.)              Ayan Tao, APRN  02/26/18 [id number]

## 2018-08-08 ENCOUNTER — HOSPITAL ENCOUNTER (OUTPATIENT)
Dept: WOMENS IMAGING | Age: 38
Discharge: HOME | End: 2018-08-08
Payer: MEDICARE

## 2018-08-08 DIAGNOSIS — Z12.31 VISIT FOR SCREENING MAMMOGRAM: ICD-10-CM

## 2018-08-08 PROCEDURE — 77067 SCR MAMMO BI INCL CAD: CPT

## 2018-08-17 ENCOUNTER — ANESTHESIA (OUTPATIENT)
Dept: PERIOP | Facility: HOSPITAL | Age: 38
End: 2018-08-17

## 2018-08-17 ENCOUNTER — ANESTHESIA EVENT (OUTPATIENT)
Dept: PERIOP | Facility: HOSPITAL | Age: 38
End: 2018-08-17

## 2018-08-17 ENCOUNTER — HOSPITAL ENCOUNTER (OUTPATIENT)
Facility: HOSPITAL | Age: 38
Discharge: HOME | End: 2018-08-18
Attending: FAMILY MEDICINE | Admitting: FAMILY MEDICINE

## 2018-08-17 DIAGNOSIS — L02.91 ABSCESS: ICD-10-CM

## 2018-08-17 LAB
ALBUMIN SERPL-MCNC: 4.3 G/DL (ref 3.5–5)
ALBUMIN/GLOB SERPL: 1.7 G/DL (ref 1.1–2.5)
ALP SERPL-CCNC: 82 U/L (ref 24–120)
ALT SERPL W P-5'-P-CCNC: 21 U/L (ref 0–54)
ANION GAP SERPL CALCULATED.3IONS-SCNC: 10 MMOL/L (ref 4–13)
APTT PPP: 26.3 SECONDS (ref 24.1–34.8)
AST SERPL-CCNC: 16 U/L (ref 7–45)
BILIRUB SERPL-MCNC: 0.6 MG/DL (ref 0.1–1)
BUN BLD-MCNC: 9 MG/DL (ref 5–21)
BUN/CREAT SERPL: 15.3 (ref 7–25)
CALCIUM SPEC-SCNC: 9.1 MG/DL (ref 8.4–10.4)
CHLORIDE SERPL-SCNC: 100 MMOL/L (ref 98–110)
CO2 SERPL-SCNC: 28 MMOL/L (ref 24–31)
CREAT BLD-MCNC: 0.59 MG/DL (ref 0.5–1.4)
DEPRECATED RDW RBC AUTO: 36.6 FL (ref 40–54)
ERYTHROCYTE [DISTWIDTH] IN BLOOD BY AUTOMATED COUNT: 11.7 % (ref 12–15)
GFR SERPL CREATININE-BSD FRML MDRD: 113 ML/MIN/1.73
GLOBULIN UR ELPH-MCNC: 2.6 GM/DL
GLUCOSE BLD-MCNC: 398 MG/DL (ref 70–100)
GLUCOSE BLDC GLUCOMTR-MCNC: 139 MG/DL (ref 70–130)
GLUCOSE BLDC GLUCOMTR-MCNC: 146 MG/DL (ref 70–130)
GLUCOSE BLDC GLUCOMTR-MCNC: 173 MG/DL (ref 70–130)
GLUCOSE BLDC GLUCOMTR-MCNC: 369 MG/DL (ref 70–130)
GLUCOSE BLDC GLUCOMTR-MCNC: 58 MG/DL (ref 70–130)
GLUCOSE BLDC GLUCOMTR-MCNC: 65 MG/DL (ref 70–130)
GLUCOSE BLDC GLUCOMTR-MCNC: 82 MG/DL (ref 70–130)
GLUCOSE BLDC GLUCOMTR-MCNC: 96 MG/DL (ref 70–130)
HCT VFR BLD AUTO: 35.6 % (ref 37–47)
HGB BLD-MCNC: 12.5 G/DL (ref 12–16)
INR PPP: 0.91 (ref 0.91–1.09)
MCH RBC QN AUTO: 30.1 PG (ref 28–32)
MCHC RBC AUTO-ENTMCNC: 35.1 G/DL (ref 33–36)
MCV RBC AUTO: 85.8 FL (ref 82–98)
PLATELET # BLD AUTO: 275 10*3/MM3 (ref 130–400)
PMV BLD AUTO: 9.7 FL (ref 6–12)
POTASSIUM BLD-SCNC: 4.1 MMOL/L (ref 3.5–5.3)
PROT SERPL-MCNC: 6.9 G/DL (ref 6.3–8.7)
PROTHROMBIN TIME: 12.5 SECONDS (ref 11.9–14.6)
RBC # BLD AUTO: 4.15 10*6/MM3 (ref 4.2–5.4)
SODIUM BLD-SCNC: 138 MMOL/L (ref 135–145)
WBC NRBC COR # BLD: 9.38 10*3/MM3 (ref 4.8–10.8)

## 2018-08-17 PROCEDURE — 25010000002 MORPHINE SULFATE (PF) 2 MG/ML SOLUTION: Performed by: ANESTHESIOLOGY

## 2018-08-17 PROCEDURE — 96375 TX/PRO/DX INJ NEW DRUG ADDON: CPT

## 2018-08-17 PROCEDURE — 87147 CULTURE TYPE IMMUNOLOGIC: CPT | Performed by: SPECIALIST

## 2018-08-17 PROCEDURE — G0378 HOSPITAL OBSERVATION PER HR: HCPCS

## 2018-08-17 PROCEDURE — 63710000001 INSULIN LISPRO (HUMAN) PER 5 UNITS: Performed by: FAMILY MEDICINE

## 2018-08-17 PROCEDURE — 85610 PROTHROMBIN TIME: CPT | Performed by: FAMILY MEDICINE

## 2018-08-17 PROCEDURE — 25010000002 ONDANSETRON PER 1 MG: Performed by: ANESTHESIOLOGY

## 2018-08-17 PROCEDURE — 87070 CULTURE OTHR SPECIMN AEROBIC: CPT | Performed by: SPECIALIST

## 2018-08-17 PROCEDURE — 25010000002 SUCCINYLCHOLINE PER 20 MG: Performed by: NURSE ANESTHETIST, CERTIFIED REGISTERED

## 2018-08-17 PROCEDURE — 85730 THROMBOPLASTIN TIME PARTIAL: CPT | Performed by: FAMILY MEDICINE

## 2018-08-17 PROCEDURE — 25010000002 MIDAZOLAM PER 1 MG: Performed by: ANESTHESIOLOGY

## 2018-08-17 PROCEDURE — 88307 TISSUE EXAM BY PATHOLOGIST: CPT | Performed by: SPECIALIST

## 2018-08-17 PROCEDURE — 25010000002 FENTANYL CITRATE (PF) 100 MCG/2ML SOLUTION: Performed by: NURSE ANESTHETIST, CERTIFIED REGISTERED

## 2018-08-17 PROCEDURE — 25010000002 DEXAMETHASONE PER 1 MG: Performed by: ANESTHESIOLOGY

## 2018-08-17 PROCEDURE — 85027 COMPLETE CBC AUTOMATED: CPT | Performed by: FAMILY MEDICINE

## 2018-08-17 PROCEDURE — 25010000002 ONDANSETRON PER 1 MG: Performed by: FAMILY MEDICINE

## 2018-08-17 PROCEDURE — 25010000002 PROPOFOL 10 MG/ML EMULSION: Performed by: NURSE ANESTHETIST, CERTIFIED REGISTERED

## 2018-08-17 PROCEDURE — 82962 GLUCOSE BLOOD TEST: CPT

## 2018-08-17 PROCEDURE — 25010000002 VANCOMYCIN PER 500 MG: Performed by: SPECIALIST

## 2018-08-17 PROCEDURE — 25010000002 ONDANSETRON PER 1 MG: Performed by: NURSE ANESTHETIST, CERTIFIED REGISTERED

## 2018-08-17 PROCEDURE — 80053 COMPREHEN METABOLIC PANEL: CPT | Performed by: FAMILY MEDICINE

## 2018-08-17 PROCEDURE — 96374 THER/PROPH/DIAG INJ IV PUSH: CPT

## 2018-08-17 PROCEDURE — 87205 SMEAR GRAM STAIN: CPT | Performed by: SPECIALIST

## 2018-08-17 RX ORDER — LABETALOL HYDROCHLORIDE 5 MG/ML
5 INJECTION, SOLUTION INTRAVENOUS
Status: DISCONTINUED | OUTPATIENT
Start: 2018-08-17 | End: 2018-08-17 | Stop reason: HOSPADM

## 2018-08-17 RX ORDER — ACETAMINOPHEN 500 MG
1000 TABLET ORAL ONCE
Status: COMPLETED | OUTPATIENT
Start: 2018-08-17 | End: 2018-08-17

## 2018-08-17 RX ORDER — DEXTROSE MONOHYDRATE 25 G/50ML
25 INJECTION, SOLUTION INTRAVENOUS
Status: DISCONTINUED | OUTPATIENT
Start: 2018-08-17 | End: 2018-08-18 | Stop reason: SDUPTHER

## 2018-08-17 RX ORDER — ONDANSETRON 8 MG/1
8 TABLET, ORALLY DISINTEGRATING ORAL EVERY 6 HOURS PRN
Status: DISCONTINUED | OUTPATIENT
Start: 2018-08-17 | End: 2018-08-18 | Stop reason: HOSPADM

## 2018-08-17 RX ORDER — NALOXONE HCL 0.4 MG/ML
0.04 VIAL (ML) INJECTION AS NEEDED
Status: DISCONTINUED | OUTPATIENT
Start: 2018-08-17 | End: 2018-08-17 | Stop reason: HOSPADM

## 2018-08-17 RX ORDER — IPRATROPIUM BROMIDE AND ALBUTEROL SULFATE 2.5; .5 MG/3ML; MG/3ML
3 SOLUTION RESPIRATORY (INHALATION) ONCE AS NEEDED
Status: DISCONTINUED | OUTPATIENT
Start: 2018-08-17 | End: 2018-08-17 | Stop reason: HOSPADM

## 2018-08-17 RX ORDER — LIDOCAINE HYDROCHLORIDE 20 MG/ML
INJECTION, SOLUTION INFILTRATION; PERINEURAL AS NEEDED
Status: DISCONTINUED | OUTPATIENT
Start: 2018-08-17 | End: 2018-08-17 | Stop reason: SURG

## 2018-08-17 RX ORDER — NICOTINE POLACRILEX 4 MG
15 LOZENGE BUCCAL
Status: DISCONTINUED | OUTPATIENT
Start: 2018-08-17 | End: 2018-08-18 | Stop reason: SDUPTHER

## 2018-08-17 RX ORDER — SIMETHICONE 80 MG
80 TABLET,CHEWABLE ORAL 4 TIMES DAILY PRN
Status: DISCONTINUED | OUTPATIENT
Start: 2018-08-17 | End: 2018-08-18 | Stop reason: HOSPADM

## 2018-08-17 RX ORDER — SUCCINYLCHOLINE CHLORIDE 20 MG/ML
INJECTION INTRAMUSCULAR; INTRAVENOUS AS NEEDED
Status: DISCONTINUED | OUTPATIENT
Start: 2018-08-17 | End: 2018-08-17 | Stop reason: SURG

## 2018-08-17 RX ORDER — FAMOTIDINE 10 MG/ML
20 INJECTION, SOLUTION INTRAVENOUS EVERY 12 HOURS SCHEDULED
Status: DISCONTINUED | OUTPATIENT
Start: 2018-08-17 | End: 2018-08-18 | Stop reason: HOSPADM

## 2018-08-17 RX ORDER — HYDROCODONE BITARTRATE AND ACETAMINOPHEN 7.5; 325 MG/1; MG/1
1 TABLET ORAL EVERY 4 HOURS PRN
Status: DISCONTINUED | OUTPATIENT
Start: 2018-08-17 | End: 2018-08-18 | Stop reason: HOSPADM

## 2018-08-17 RX ORDER — OXYCODONE AND ACETAMINOPHEN 10; 325 MG/1; MG/1
1 TABLET ORAL ONCE AS NEEDED
Status: DISCONTINUED | OUTPATIENT
Start: 2018-08-17 | End: 2018-08-17 | Stop reason: HOSPADM

## 2018-08-17 RX ORDER — LIDOCAINE HYDROCHLORIDE 40 MG/ML
SOLUTION TOPICAL AS NEEDED
Status: DISCONTINUED | OUTPATIENT
Start: 2018-08-17 | End: 2018-08-17 | Stop reason: SURG

## 2018-08-17 RX ORDER — FLUMAZENIL 0.1 MG/ML
0.2 INJECTION INTRAVENOUS AS NEEDED
Status: DISCONTINUED | OUTPATIENT
Start: 2018-08-17 | End: 2018-08-17 | Stop reason: HOSPADM

## 2018-08-17 RX ORDER — SODIUM CHLORIDE 9 MG/ML
125 INJECTION, SOLUTION INTRAVENOUS CONTINUOUS
Status: DISCONTINUED | OUTPATIENT
Start: 2018-08-17 | End: 2018-08-18 | Stop reason: HOSPADM

## 2018-08-17 RX ORDER — SUCRALFATE ORAL 1 G/10ML
1 SUSPENSION ORAL EVERY 6 HOURS SCHEDULED
Status: DISCONTINUED | OUTPATIENT
Start: 2018-08-17 | End: 2018-08-18 | Stop reason: HOSPADM

## 2018-08-17 RX ORDER — MEPERIDINE HYDROCHLORIDE 25 MG/ML
12.5 INJECTION INTRAMUSCULAR; INTRAVENOUS; SUBCUTANEOUS
Status: DISCONTINUED | OUTPATIENT
Start: 2018-08-17 | End: 2018-08-17 | Stop reason: HOSPADM

## 2018-08-17 RX ORDER — MIDAZOLAM HYDROCHLORIDE 1 MG/ML
2 INJECTION INTRAMUSCULAR; INTRAVENOUS
Status: DISCONTINUED | OUTPATIENT
Start: 2018-08-17 | End: 2018-08-17 | Stop reason: HOSPADM

## 2018-08-17 RX ORDER — DEXAMETHASONE SODIUM PHOSPHATE 4 MG/ML
4 INJECTION, SOLUTION INTRA-ARTICULAR; INTRALESIONAL; INTRAMUSCULAR; INTRAVENOUS; SOFT TISSUE ONCE AS NEEDED
Status: COMPLETED | OUTPATIENT
Start: 2018-08-17 | End: 2018-08-17

## 2018-08-17 RX ORDER — FAMOTIDINE 20 MG/1
20 TABLET, FILM COATED ORAL 2 TIMES DAILY
Status: DISCONTINUED | OUTPATIENT
Start: 2018-08-17 | End: 2018-08-18 | Stop reason: HOSPADM

## 2018-08-17 RX ORDER — CLONAZEPAM 1 MG/1
1 TABLET ORAL DAILY
Status: DISCONTINUED | OUTPATIENT
Start: 2018-08-17 | End: 2018-08-18 | Stop reason: HOSPADM

## 2018-08-17 RX ORDER — HYDRALAZINE HYDROCHLORIDE 20 MG/ML
5 INJECTION INTRAMUSCULAR; INTRAVENOUS
Status: DISCONTINUED | OUTPATIENT
Start: 2018-08-17 | End: 2018-08-17 | Stop reason: HOSPADM

## 2018-08-17 RX ORDER — FAMOTIDINE 20 MG/1
20 TABLET, FILM COATED ORAL EVERY 12 HOURS SCHEDULED
Status: DISCONTINUED | OUTPATIENT
Start: 2018-08-17 | End: 2018-08-18 | Stop reason: HOSPADM

## 2018-08-17 RX ORDER — ROCURONIUM BROMIDE 10 MG/ML
INJECTION, SOLUTION INTRAVENOUS AS NEEDED
Status: DISCONTINUED | OUTPATIENT
Start: 2018-08-17 | End: 2018-08-17 | Stop reason: SURG

## 2018-08-17 RX ORDER — SODIUM CHLORIDE, SODIUM LACTATE, POTASSIUM CHLORIDE, CALCIUM CHLORIDE 600; 310; 30; 20 MG/100ML; MG/100ML; MG/100ML; MG/100ML
100 INJECTION, SOLUTION INTRAVENOUS CONTINUOUS
Status: DISCONTINUED | OUTPATIENT
Start: 2018-08-17 | End: 2018-08-18

## 2018-08-17 RX ORDER — SODIUM CHLORIDE 0.9 % (FLUSH) 0.9 %
1-10 SYRINGE (ML) INJECTION AS NEEDED
Status: DISCONTINUED | OUTPATIENT
Start: 2018-08-17 | End: 2018-08-17 | Stop reason: HOSPADM

## 2018-08-17 RX ORDER — FENTANYL CITRATE 50 UG/ML
INJECTION, SOLUTION INTRAMUSCULAR; INTRAVENOUS AS NEEDED
Status: DISCONTINUED | OUTPATIENT
Start: 2018-08-17 | End: 2018-08-17 | Stop reason: SURG

## 2018-08-17 RX ORDER — METOCLOPRAMIDE HYDROCHLORIDE 5 MG/ML
5 INJECTION INTRAMUSCULAR; INTRAVENOUS
Status: DISCONTINUED | OUTPATIENT
Start: 2018-08-17 | End: 2018-08-17 | Stop reason: HOSPADM

## 2018-08-17 RX ORDER — ONDANSETRON 2 MG/ML
4 INJECTION INTRAMUSCULAR; INTRAVENOUS AS NEEDED
Status: DISCONTINUED | OUTPATIENT
Start: 2018-08-17 | End: 2018-08-17 | Stop reason: HOSPADM

## 2018-08-17 RX ORDER — MIDAZOLAM HYDROCHLORIDE 1 MG/ML
1 INJECTION INTRAMUSCULAR; INTRAVENOUS
Status: DISCONTINUED | OUTPATIENT
Start: 2018-08-17 | End: 2018-08-17 | Stop reason: HOSPADM

## 2018-08-17 RX ORDER — ONDANSETRON 2 MG/ML
4 INJECTION INTRAMUSCULAR; INTRAVENOUS EVERY 6 HOURS PRN
Status: DISCONTINUED | OUTPATIENT
Start: 2018-08-17 | End: 2018-08-18 | Stop reason: HOSPADM

## 2018-08-17 RX ORDER — MAGNESIUM HYDROXIDE 1200 MG/15ML
LIQUID ORAL AS NEEDED
Status: DISCONTINUED | OUTPATIENT
Start: 2018-08-17 | End: 2018-08-17 | Stop reason: HOSPADM

## 2018-08-17 RX ORDER — TRAMADOL HYDROCHLORIDE 50 MG/1
50 TABLET ORAL 2 TIMES DAILY PRN
Status: DISCONTINUED | OUTPATIENT
Start: 2018-08-17 | End: 2018-08-18 | Stop reason: HOSPADM

## 2018-08-17 RX ORDER — MORPHINE SULFATE 2 MG/ML
2 INJECTION, SOLUTION INTRAMUSCULAR; INTRAVENOUS
Status: DISCONTINUED | OUTPATIENT
Start: 2018-08-17 | End: 2018-08-17 | Stop reason: HOSPADM

## 2018-08-17 RX ORDER — SCOLOPAMINE TRANSDERMAL SYSTEM 1 MG/1
1 PATCH, EXTENDED RELEASE TRANSDERMAL ONCE
Status: DISCONTINUED | OUTPATIENT
Start: 2018-08-17 | End: 2018-08-18

## 2018-08-17 RX ORDER — FENTANYL CITRATE 50 UG/ML
25 INJECTION, SOLUTION INTRAMUSCULAR; INTRAVENOUS AS NEEDED
Status: DISCONTINUED | OUTPATIENT
Start: 2018-08-17 | End: 2018-08-17 | Stop reason: HOSPADM

## 2018-08-17 RX ORDER — PROPOFOL 10 MG/ML
VIAL (ML) INTRAVENOUS AS NEEDED
Status: DISCONTINUED | OUTPATIENT
Start: 2018-08-17 | End: 2018-08-17 | Stop reason: SURG

## 2018-08-17 RX ORDER — ONDANSETRON 2 MG/ML
INJECTION INTRAMUSCULAR; INTRAVENOUS AS NEEDED
Status: DISCONTINUED | OUTPATIENT
Start: 2018-08-17 | End: 2018-08-17 | Stop reason: SURG

## 2018-08-17 RX ADMIN — ONDANSETRON HYDROCHLORIDE 4 MG: 2 SOLUTION INTRAMUSCULAR; INTRAVENOUS at 19:18

## 2018-08-17 RX ADMIN — MORPHINE SULFATE 2 MG: 2 INJECTION, SOLUTION INTRAMUSCULAR; INTRAVENOUS at 20:16

## 2018-08-17 RX ADMIN — SODIUM CHLORIDE, POTASSIUM CHLORIDE, SODIUM LACTATE AND CALCIUM CHLORIDE 100 ML/HR: 600; 310; 30; 20 INJECTION, SOLUTION INTRAVENOUS at 17:48

## 2018-08-17 RX ADMIN — ONDANSETRON 4 MG: 2 INJECTION, SOLUTION INTRAMUSCULAR; INTRAVENOUS at 16:58

## 2018-08-17 RX ADMIN — ONDANSETRON 4 MG: 2 INJECTION INTRAMUSCULAR; INTRAVENOUS at 20:17

## 2018-08-17 RX ADMIN — ROCURONIUM BROMIDE 10 MG: 10 INJECTION INTRAVENOUS at 18:59

## 2018-08-17 RX ADMIN — FENTANYL CITRATE 100 MCG: 50 INJECTION, SOLUTION INTRAMUSCULAR; INTRAVENOUS at 18:59

## 2018-08-17 RX ADMIN — DEXTROSE MONOHYDRATE 25 G: 25 INJECTION, SOLUTION INTRAVENOUS at 16:35

## 2018-08-17 RX ADMIN — CLONAZEPAM 1 MG: 1 TABLET ORAL at 21:47

## 2018-08-17 RX ADMIN — LIDOCAINE HYDROCHLORIDE 1 EACH: 40 SOLUTION TOPICAL at 19:01

## 2018-08-17 RX ADMIN — LIDOCAINE HYDROCHLORIDE 100 MG: 20 INJECTION, SOLUTION INFILTRATION; PERINEURAL at 18:59

## 2018-08-17 RX ADMIN — SODIUM CHLORIDE 125 ML/HR: 9 INJECTION, SOLUTION INTRAVENOUS at 21:38

## 2018-08-17 RX ADMIN — SCOPOLAMINE 1 PATCH: 1 PATCH, EXTENDED RELEASE TRANSDERMAL at 17:56

## 2018-08-17 RX ADMIN — DEXAMETHASONE SODIUM PHOSPHATE 4 MG: 4 INJECTION, SOLUTION INTRA-ARTICULAR; INTRALESIONAL; INTRAMUSCULAR; INTRAVENOUS; SOFT TISSUE at 17:57

## 2018-08-17 RX ADMIN — DEXTROSE MONOHYDRATE 12.5 G: 25 INJECTION, SOLUTION INTRAVENOUS at 17:50

## 2018-08-17 RX ADMIN — FAMOTIDINE 20 MG: 20 TABLET, FILM COATED ORAL at 21:41

## 2018-08-17 RX ADMIN — SODIUM CHLORIDE 125 ML/HR: 9 INJECTION, SOLUTION INTRAVENOUS at 16:58

## 2018-08-17 RX ADMIN — MIDAZOLAM 2 MG: 1 INJECTION INTRAMUSCULAR; INTRAVENOUS at 17:57

## 2018-08-17 RX ADMIN — SUCRALFATE 1 G: 1 SUSPENSION ORAL at 21:42

## 2018-08-17 RX ADMIN — METHYLCELLULOSE 1000 MG: 500 TABLET ORAL at 21:41

## 2018-08-17 RX ADMIN — INSULIN LISPRO 6 UNITS: 100 INJECTION, SOLUTION INTRAVENOUS; SUBCUTANEOUS at 13:39

## 2018-08-17 RX ADMIN — PROPOFOL 150 MG: 10 INJECTION, EMULSION INTRAVENOUS at 18:59

## 2018-08-17 RX ADMIN — ACETAMINOPHEN 1000 MG: 500 TABLET, FILM COATED ORAL at 17:57

## 2018-08-17 RX ADMIN — VANCOMYCIN HYDROCHLORIDE 1000 MG: 1 INJECTION, POWDER, LYOPHILIZED, FOR SOLUTION INTRAVENOUS at 17:50

## 2018-08-17 RX ADMIN — SUCCINYLCHOLINE CHLORIDE 100 MG: 20 INJECTION, SOLUTION INTRAMUSCULAR; INTRAVENOUS at 19:00

## 2018-08-17 NOTE — PROGRESS NOTES
Discharge Planning Assessment  [facility]     Patient Name: Misa Martin  MRN: [id number]  Today's Date: 8/17/2018    Admit Date: 8/17/2018          Discharge Needs Assessment     Row Name 08/17/18 1501       Living Environment    Lives With child(madelyn), dependent    Current Living Arrangements home/apartment/condo    Primary Care Provided by self    Provides Primary Care For child(madelyn)    Family Caregiver if Needed parent(s)    Quality of Family Relationships helpful;involved;supportive    Able to Return to Prior Arrangements yes       Resource/Environmental Concerns    Resource/Environmental Concerns none    Transportation Concerns car, none       Transition Planning    Patient/Family Anticipates Transition to home    Transportation Anticipated family or friend will provide       Discharge Needs Assessment    Readmission Within the Last 30 Days no previous admission in last 30 days    Concerns to be Addressed denies needs/concerns at this time    Equipment Currently Used at Home glucometer    Equipment Needed After Discharge negative pressure wound therapy device            Discharge Plan     Row Name 08/17/18 1503       Plan    Plan HOME WITH [facility] HOME VAC; PENDING INS APPROVAL    Patient/Family in Agreement with Plan yes    Plan Comments SUBMITTED I HOME VAC FOR INS APPROVAL.  PT. WANTS TO GET DRESSING CHANGES AT [facility] AND FIRST APPT. HAS BEEN SCHEDULED FOR MONDAY THE 20TH AT 8:15 AM.          Destination     No service coordination in this encounter.      Durable Medical Equipment     No service coordination in this encounter.      Dialysis/Infusion     No service coordination in this encounter.      Home Medical Care     No service coordination in this encounter.      Social Care     No service coordination in this encounter.                Demographic Summary    No documentation.           Functional Status    No documentation.           Psychosocial    No documentation.            Abuse/Neglect    No documentation.           Legal    No documentation.           Substance Abuse    No documentation.           Patient Forms    No documentation.         [place]

## 2018-08-17 NOTE — PLAN OF CARE
Problem: Patient Care Overview  Goal: Plan of Care Review  Outcome: Ongoing (interventions implemented as appropriate)   08/17/18 1543   Coping/Psychosocial   Plan of Care Reviewed With patient   Plan of Care Review   Progress no change   OTHER   Outcome Summary Patient admitted to 3[facility] from Dr Mendoza's office. Pt c/o abscess to right groin for 1 week, no fever, did drain some white pus, open to air. Noted dime sized area to right groin. Consulted Dr Ochoa for surgical management, plan for I&D with wound vac placement     Goal: Individualization and Mutuality  Outcome: Ongoing (interventions implemented as appropriate)   08/17/18 1543   Individualization   Patient Specific Preferences Keep door close   Patient Specific Goals (Include Timeframe) Go home   Patient Specific Interventions Keep door close   Mutuality/Individual Preferences   What Anxieties, Fears, Concerns, or Questions Do You Have About Your Care? Wound vac   What Information Would Help Us Give You More Personalized Care? Educate   How Would You and/or Your Support Person Like to Participate in Your Care? Educate   Mutuality/Individual Preferences   How to Address Anxieties/Fears Education     Goal: Discharge Needs Assessment  Outcome: Ongoing (interventions implemented as appropriate)   08/17/18 1501 08/17/18 1543   Discharge Needs Assessment   Readmission Within the Last 30 Days no previous admission in last 30 days --    Concerns to be Addressed denies needs/concerns at this time --    Patient/Family Anticipates Transition to home --    Patient/Family Anticipated Services at Transition --     Transportation Concerns --  car, none   Transportation Anticipated family or friend will provide --    Anticipated Changes Related to Illness --  none   Equipment Needed After Discharge negative pressure wound therapy device --    Discharge Facility/Level of Care Needs --  home with home health   Disability   Equipment Currently Used at Home  glucometer --      Goal: Interprofessional Rounds/Family Conf  Outcome: Ongoing (interventions implemented as appropriate)   08/17/18 1543   Interdisciplinary Rounds/Family Conf   Participants ;patient;family;physician       Problem: Skin and Soft Tissue Infection (Adult)  Intervention: Control Infection Risk   08/17/18 1543   Pain/Comfort/Sleep Interventions   Sleep/Rest Enhancement awakenings minimized   Safety Management   Infection Prevention rest/sleep promoted;single patient room provided     Intervention: Prevent/Manage DVT/VTE Risk   08/17/18 1339   Interventions   VTE Prevention/Management sequential compression devices on;bilateral     Intervention: Minimize/Manage Wound Associated Edema   08/17/18 1120   Positioning   Body Position supine     Intervention: Monitor/Manage Pain   08/17/18 1120 08/17/18 1400   Promote Oxygenation/Perfusion   Pain Management Interventions --  position adjusted   Safety Management   Medication Review/Management medications reviewed --      Intervention: Prevent Skin/Soft Tissue Infection Progression   08/17/18 1543   Prevent/Manage Colorectal Surgical Infection   Fever Reduction/Comfort Measures other (see comments);lightweight bedding  (pt currently afebrile)     Intervention: Optimize Psychosocial Response to Skin/Soft Tissue Infection   08/17/18 1120   Interventions   Trust Relationship/Rapport care explained;emotional support provided;choices provided;empathic listening provided;questions answered;reassurance provided;questions encouraged;thoughts/feelings acknowledged   Psychosocial Support   Family/Support System Care involvement promoted;presence promoted     Intervention: Provide Wound Management and Care   08/17/18 1543   Skin Interventions   Pressure Reduction Techniques frequent weight shift encouraged       Goal: Signs and Symptoms of Listed Potential Problems Will be Absent, Minimized or Managed (Skin and Soft Tissue Infection)  Outcome: Ongoing  (interventions implemented as appropriate)   08/17/18 9567   Goal/Outcome Evaluation   Problems Assessed (Skin and Soft Tissue Infection) all   Problems Present (Skin/Soft Tissue Inf) infection progression;pain

## 2018-08-17 NOTE — CONSULTS
Patient Care Team:  Berhane Mendoza MD as PCP - General  Berhane Mendoza MD as PCP - Family Medicine    Chief complaint right inguinal abscess    Subjective     Subjective .     History of present illness:  Misa Martin is a 37-year-old female who has had a previous abscess in her buttock area that was drained by Dr. Gill pain now has a progressive bulge in her right infra inguinal crease.  The area is approximately 5 cm long 2-1/2 cm wide 4 bulb was erythematous and fluctuant.  She needs to have this area opened and drained on the way to the hospital after being admitted by her PCP she stopped in consenting to eat and as result we will need to wait until this has processed and we will look toward opening of the area drainage and a wound vacuum placement.  She is aware the procedure its risk and benefits and with full knowledge of this an apparent understanding she gives her informed consent for surgery.    Past surgical history significant for cholecystectomy in , abscess drainage from the gluteal region , hysterectomy , left ovary removal, and also left breast excisional biopsy.    She smokes 1 pack per day for 20 years, no drinking.    Medical problems if they are for diabetes, neuropathy of her right foot, anxiety.    Allergies to penicillin, Keflex, cephalosporins and sulfur.     her daughters 15 years old.    She does not work outside the home, she is on disability.        Review of Systems  Pertinent items are noted in HPI, all other systems reviewed and negative    History  Past Medical History:   Diagnosis Date   • Allergic    • Anxiety    • Arthritis    • Cancer (CMS/HCC)    • Colitis    • Depression    • Diabetes mellitus type I (CMS/HCC)    • Gallbladder abscess    , Past Surgical History:   Procedure Laterality Date   • CHOLECYSTECTOMY     • HYSTERECTOMY     , Family History   Problem Relation Age of Onset   • Cancer Mother    • Diabetes Mother    • Cancer Father    •  Heart disease Father    • Lung disease Father    , Social History   Substance Use Topics   • Smoking status: Current Every Day Smoker     Packs/day: 0.50     Years: 20.00     Types: Cigarettes   • Smokeless tobacco: Not on file   • Alcohol use No   , Prescriptions Prior to Admission   Medication Sig Dispense Refill Last Dose   • clonazePAM (KlonoPIN) 1 MG tablet Take 1 mg by mouth three times daily    Taking   • Insulin Glargine 300 UNIT/ML solution pen-injector Inject 24 Units into the skin every morning Indications: TOUJEO       • insulin lispro (HumaLOG) 100 UNIT/ML injection Inject into the skin 3 times daily (before meals) Indications: sliding scale - USUALLY 4 UNITS    Taking   • ondansetron ODT (ZOFRAN ODT) 8 MG disintegrating tablet Take 1 tablet by mouth Every 8 (Eight) Hours As Needed for Nausea or Vomiting. 12 tablet 0    • TRAMADOL HCL PO Take 50 mg by mouth 2 (Two) Times a Day As Needed (PRN moderate pain).      , Scheduled Meds:    insulin lispro 2-7 Units Subcutaneous 4x Daily With Meals & Nightly   , Continuous Infusions:   , PRN Meds:  •  dextrose  •  dextrose  •  glucagon (human recombinant) and Allergies:  Cefaclor; Cephalexin; Penicillins; and Sulfa antibiotics    Objective      Objective     Vital Signs   Temp:  [98.5 °F (36.9 °C)] 98.5 °F (36.9 °C)  Heart Rate:  [89] 89  Resp:  [16] 16  BP: (106)/(63) 106/63    Intake & Output (last 3 days)     None           Physical Exam:     General Appearance:    Alert, cooperative, in no acute distress thin white female in mild distress    Head:    Normocephalic, without obvious abnormality, atraumatic   Eyes:            Lids and lashes normal, conjunctivae and sclerae normal, no   icterus, no pallor, corneas clear, PERRLA   Ears:    Ears appear intact with no abnormalities noted   Throat:   No oral lesions, no thrush, oral mucosa moist   Neck:   No adenopathy, supple, trachea midline, no thyromegaly, no   carotid bruit, no JVD   Back:     No kyphosis  present, no scoliosis present, no skin lesions,      erythema or scars, no tenderness to percussion or                   palpation,   range of motion normal   Lungs:     Clear to auscultation,respirations regular, even and                  unlabored    Heart:    Regular rhythm and normal rate, normal S1 and S2, no            murmur, no gallop, no rub, no click   Chest Wall:    No abnormalities observed   Abdomen:     Normal bowel sounds, no masses, no organomegaly, soft        non-tender, non-distended, no guarding, no rebound                tenderness   Rectal:     Deferred   Extremities:   Moves all extremities well, no edema, no cyanosis, no             redness in the right infra inguinal fold there is a raised tender erythematous area for a half to 5 cm long, 2-1/2 cm wide and raised, with surrounding erythema.  Abscess in the right inguinal crease for incision drainage and wound vacuum placement.     Pulses:   Pulses palpable and equal bilaterally   Skin:   No bleeding, bruising or rash   Lymph nodes:   No palpable adenopathy   Neurologic:   Cranial nerves 2 - 12 grossly intact, sensation intact, DTR       present and equal bilaterally          Results from last 7 days  Lab Units 08/17/18  1239   WBC 10*3/mm3 9.38   HEMOGLOBIN g/dL 12.5   HEMATOCRIT % 35.6*   PLATELETS 10*3/mm3 275              Invalid input(s): LABALBU, PROT      Results Review:   I reviewed the patient's new clinical results.    Assessment/Plan     Assessment/Plan     Active Problems:    * No active hospital problems. *      Patient with diabetes, anxiety, peripheral neuropathy on the right, now with a infection in the right infra inguinal fold, plan for incision and drainage and wound vacuum placement to be accomplished on August 17.  Risk and benefits discussed she gives her informed consent for surgery    I discussed the patient's findings and my recommendations with patient, family and nursing staff    Balaji Ochoa,  MD  08/17/18  12:50 PM    Time: Time spent with patient 45 minutes     EMR Dragon/Transcription disclaimer: Much of this encounter note is an electronic transcription/translation of spoken language to printed text. The electronic translation of spoken language may permit erroneous, or at times, nonsensical words or phrases to be inadvertently transcribed; although I have reviewed the note for such errors, some may still exist.

## 2018-08-17 NOTE — PROGRESS NOTES
Continued Stay Note  [facility]     Patient Name: Misa Martin  MRN: [id number]  Today's Date: 8/17/2018    Admit Date: 8/17/2018          Discharge Plan     Row Name 08/17/18 1643       Plan    Plan HOME WITH [facility]    Plan Comments REC'D INS APPROVAL FOR HOME [facility].  WILL DELIVER VAC TO PT'S ROOM UPON D/C.  PT. IS AWARE AND AGREES WITH [facility] WOUND CARE APPT. SCHEDULED FOR MONDAY AT 8:15AM.    Row Name 08/17/18 1507       Plan    Plan HOME WITH [facility]; PENDING INS APPROVAL    Patient/Family in Agreement with Plan yes    Plan Comments SUBMITTED [facility] FOR INS APPROVAL.  PT. WANTS TO GET DRESSING CHANGES AT [facility] WOUND CARE [facility] AND FIRST APPT. HAS BEEN SCHEDULED FOR MONDAY THE 20TH AT 8:15 AM.                Discharge Codes    No documentation.           [place]

## 2018-08-17 NOTE — ANESTHESIA PREPROCEDURE EVALUATION
Anesthesia Evaluation     Patient summary reviewed   no history of anesthetic complications:  NPO Solid Status: > 6 hours  NPO Liquid Status: > 8 hours           Airway   Mallampati: II  TM distance: >3 FB  Neck ROM: full  No difficulty expected  Dental - normal exam     Pulmonary    (+) a smoker Current Smoked day of surgery,   Cardiovascular - negative cardio ROS  Exercise tolerance: good (4-7 METS)        Neuro/Psych- negative ROS  GI/Hepatic/Renal/Endo    (+)   diabetes mellitus (very brittle) type 1 using insulin,     Musculoskeletal (-) negative ROS    Abdominal    Substance History      OB/GYN          Other                      Anesthesia Plan    ASA 3     general     intravenous induction   Anesthetic plan and risks discussed with patient.

## 2018-08-18 VITALS
WEIGHT: 106 LBS | SYSTOLIC BLOOD PRESSURE: 90 MMHG | TEMPERATURE: 98.3 F | RESPIRATION RATE: 18 BRPM | DIASTOLIC BLOOD PRESSURE: 49 MMHG | BODY MASS INDEX: 17.66 KG/M2 | OXYGEN SATURATION: 97 % | HEIGHT: 65 IN | HEART RATE: 85 BPM

## 2018-08-18 PROBLEM — F41.1 GENERALIZED ANXIETY DISORDER: Chronic | Status: ACTIVE | Noted: 2018-08-18

## 2018-08-18 PROBLEM — IMO0002 UNCONTROLLED TYPE 2 DIABETES MELLITUS WITH COMPLICATION, WITH LONG-TERM CURRENT USE OF INSULIN: Chronic | Status: RESOLVED | Noted: 2018-08-18 | Resolved: 2018-08-18

## 2018-08-18 PROBLEM — IMO0002 UNCONTROLLED TYPE 2 DIABETES MELLITUS WITH COMPLICATION, WITH LONG-TERM CURRENT USE OF INSULIN: Chronic | Status: ACTIVE | Noted: 2018-08-18

## 2018-08-18 PROBLEM — F17.210 CIGARETTE NICOTINE DEPENDENCE WITHOUT COMPLICATION: Chronic | Status: ACTIVE | Noted: 2018-08-18

## 2018-08-18 LAB
ALBUMIN SERPL-MCNC: 3.4 G/DL (ref 3.5–5)
ALBUMIN/GLOB SERPL: 1.3 G/DL (ref 1.1–2.5)
ALP SERPL-CCNC: 79 U/L (ref 24–120)
ALT SERPL W P-5'-P-CCNC: 68 U/L (ref 0–54)
ANION GAP SERPL CALCULATED.3IONS-SCNC: 10 MMOL/L (ref 4–13)
AST SERPL-CCNC: 82 U/L (ref 7–45)
BILIRUB SERPL-MCNC: 0.4 MG/DL (ref 0.1–1)
BUN BLD-MCNC: 11 MG/DL (ref 5–21)
BUN/CREAT SERPL: 19 (ref 7–25)
CALCIUM SPEC-SCNC: 8.3 MG/DL (ref 8.4–10.4)
CHLORIDE SERPL-SCNC: 104 MMOL/L (ref 98–110)
CO2 SERPL-SCNC: 23 MMOL/L (ref 24–31)
CREAT BLD-MCNC: 0.58 MG/DL (ref 0.5–1.4)
DEPRECATED RDW RBC AUTO: 38.1 FL (ref 40–54)
ERYTHROCYTE [DISTWIDTH] IN BLOOD BY AUTOMATED COUNT: 11.9 % (ref 12–15)
GFR SERPL CREATININE-BSD FRML MDRD: 116 ML/MIN/1.73
GLOBULIN UR ELPH-MCNC: 2.6 GM/DL
GLUCOSE BLD-MCNC: 394 MG/DL (ref 70–100)
GLUCOSE BLDC GLUCOMTR-MCNC: 297 MG/DL (ref 70–130)
GLUCOSE BLDC GLUCOMTR-MCNC: 367 MG/DL (ref 70–130)
GLUCOSE BLDC GLUCOMTR-MCNC: 424 MG/DL (ref 70–130)
GLUCOSE BLDC GLUCOMTR-MCNC: 438 MG/DL (ref 70–130)
HBA1C MFR BLD: 12.4 %
HCT VFR BLD AUTO: 35 % (ref 37–47)
HGB BLD-MCNC: 12.3 G/DL (ref 12–16)
MCH RBC QN AUTO: 30.7 PG (ref 28–32)
MCHC RBC AUTO-ENTMCNC: 35.1 G/DL (ref 33–36)
MCV RBC AUTO: 87.3 FL (ref 82–98)
PLATELET # BLD AUTO: 273 10*3/MM3 (ref 130–400)
PMV BLD AUTO: 10.1 FL (ref 6–12)
POTASSIUM BLD-SCNC: 4.3 MMOL/L (ref 3.5–5.3)
PROT SERPL-MCNC: 6 G/DL (ref 6.3–8.7)
RBC # BLD AUTO: 4.01 10*6/MM3 (ref 4.2–5.4)
SODIUM BLD-SCNC: 137 MMOL/L (ref 135–145)
WBC NRBC COR # BLD: 8.55 10*3/MM3 (ref 4.8–10.8)

## 2018-08-18 PROCEDURE — G0378 HOSPITAL OBSERVATION PER HR: HCPCS

## 2018-08-18 PROCEDURE — 63710000001 INSULIN LISPRO (HUMAN) PER 5 UNITS: Performed by: FAMILY MEDICINE

## 2018-08-18 PROCEDURE — 94799 UNLISTED PULMONARY SVC/PX: CPT

## 2018-08-18 PROCEDURE — 94760 N-INVAS EAR/PLS OXIMETRY 1: CPT

## 2018-08-18 PROCEDURE — 82962 GLUCOSE BLOOD TEST: CPT

## 2018-08-18 PROCEDURE — 83036 HEMOGLOBIN GLYCOSYLATED A1C: CPT | Performed by: FAMILY MEDICINE

## 2018-08-18 PROCEDURE — 80053 COMPREHEN METABOLIC PANEL: CPT | Performed by: SPECIALIST

## 2018-08-18 PROCEDURE — 85027 COMPLETE CBC AUTOMATED: CPT | Performed by: SPECIALIST

## 2018-08-18 PROCEDURE — 63710000001 INSULIN DETEMIR PER 5 UNITS: Performed by: SPECIALIST

## 2018-08-18 RX ORDER — IPRATROPIUM BROMIDE AND ALBUTEROL SULFATE 2.5; .5 MG/3ML; MG/3ML
3 SOLUTION RESPIRATORY (INHALATION) EVERY 6 HOURS PRN
Status: DISCONTINUED | OUTPATIENT
Start: 2018-08-18 | End: 2018-08-18 | Stop reason: HOSPADM

## 2018-08-18 RX ORDER — NICOTINE POLACRILEX 4 MG
15 LOZENGE BUCCAL
Status: DISCONTINUED | OUTPATIENT
Start: 2018-08-18 | End: 2018-08-18 | Stop reason: HOSPADM

## 2018-08-18 RX ORDER — METHYLCELLULOSE 2 G/19G
2 POWDER, FOR SOLUTION ORAL DAILY
Qty: 60 G | Refills: 6 | Status: SHIPPED | OUTPATIENT
Start: 2018-08-18 | End: 2018-09-17

## 2018-08-18 RX ORDER — HYDROCODONE BITARTRATE AND ACETAMINOPHEN 5; 325 MG/1; MG/1
1 TABLET ORAL EVERY 4 HOURS PRN
Qty: 40 TABLET | Refills: 0 | Status: SHIPPED | OUTPATIENT
Start: 2018-08-18 | End: 2020-12-02

## 2018-08-18 RX ORDER — DEXTROSE MONOHYDRATE 25 G/50ML
25 INJECTION, SOLUTION INTRAVENOUS
Status: DISCONTINUED | OUTPATIENT
Start: 2018-08-18 | End: 2018-08-18 | Stop reason: HOSPADM

## 2018-08-18 RX ORDER — CLINDAMYCIN HYDROCHLORIDE 150 MG/1
300 CAPSULE ORAL 4 TIMES DAILY
Qty: 40 CAPSULE | Refills: 0 | Status: SHIPPED | OUTPATIENT
Start: 2018-08-18 | End: 2018-08-28

## 2018-08-18 RX ORDER — DEXTROSE AND SODIUM CHLORIDE 5; .45 G/100ML; G/100ML
75 INJECTION, SOLUTION INTRAVENOUS CONTINUOUS
Status: DISCONTINUED | OUTPATIENT
Start: 2018-08-18 | End: 2018-08-18 | Stop reason: HOSPADM

## 2018-08-18 RX ORDER — ONDANSETRON HCL 8 MG
8 TABLET ORAL EVERY 8 HOURS PRN
Qty: 10 TABLET | Refills: 1 | Status: SHIPPED | OUTPATIENT
Start: 2018-08-18 | End: 2020-12-02

## 2018-08-18 RX ADMIN — INSULIN LISPRO 8 UNITS: 100 INJECTION, SOLUTION INTRAVENOUS; SUBCUTANEOUS at 12:14

## 2018-08-18 RX ADMIN — FAMOTIDINE 20 MG: 20 TABLET, FILM COATED ORAL at 08:16

## 2018-08-18 RX ADMIN — SUCRALFATE 1 G: 1 SUSPENSION ORAL at 12:14

## 2018-08-18 RX ADMIN — SUCRALFATE 1 G: 1 SUSPENSION ORAL at 06:49

## 2018-08-18 RX ADMIN — CLONAZEPAM 1 MG: 1 TABLET ORAL at 08:16

## 2018-08-18 RX ADMIN — INSULIN LISPRO 9 UNITS: 100 INJECTION, SOLUTION INTRAVENOUS; SUBCUTANEOUS at 08:24

## 2018-08-18 RX ADMIN — SODIUM CHLORIDE 125 ML/HR: 9 INJECTION, SOLUTION INTRAVENOUS at 06:06

## 2018-08-18 RX ADMIN — METHYLCELLULOSE 1000 MG: 500 TABLET ORAL at 08:16

## 2018-08-18 RX ADMIN — INSULIN DETEMIR 25 UNITS: 100 INJECTION, SOLUTION SUBCUTANEOUS at 08:16

## 2018-08-18 NOTE — PROGRESS NOTES
Continued Stay Note   [place]     Patient Name: Misa Martin  MRN: [id number]  Today's Date: 8/18/2018    Admit Date: 8/17/2018          Discharge Plan     Row Name 08/18/18 1332       Plan    Patient/Family in Agreement with Plan yes    Final Discharge Disposition Code 01 - home or self-care    Final Note SW delivered pt's wound vac to her.  Proof of delivery form was faxed to [facility].  Pt will dc today and go to outpt wound care.  ALVARO Thorne.              Discharge Codes    No documentation.       Expected Discharge Date and Time     Expected Discharge Date Expected Discharge Time    Aug 18, 2018             ALVARO Pozo

## 2018-08-18 NOTE — PLAN OF CARE
Problem: Patient Care Overview  Goal: Plan of Care Review  Outcome: Ongoing (interventions implemented as appropriate)   08/18/18 0523   Coping/Psychosocial   Plan of Care Reviewed With patient;family   OTHER   Outcome Summary Pt. rested well this shift. Wound Vac in place to right groin. Dressing intact. IVFs continue. Pt. tolerating po. Ambulated to bathroom with assist to void. No S/S distress noted. Continue to monitor.      Goal: Individualization and Mutuality  Outcome: Ongoing (interventions implemented as appropriate)   08/18/18 0523   Individualization   Patient Specific Goals (Include Timeframe) Go home.   Patient Specific Interventions Wound Vac in place to right groin.     Goal: Interprofessional Rounds/Family Conf  Outcome: Ongoing (interventions implemented as appropriate)   08/18/18 0523   Interdisciplinary Rounds/Family Conf   Participants patient;nursing       Problem: Skin and Soft Tissue Infection (Adult)  Goal: Signs and Symptoms of Listed Potential Problems Will be Absent, Minimized or Managed (Skin and Soft Tissue Infection)  Outcome: Ongoing (interventions implemented as appropriate)   08/18/18 0523   Goal/Outcome Evaluation   Problems Assessed (Skin and Soft Tissue Infection) all   Problems Present (Skin/Soft Tissue Inf) other (see comments);infection progression  (Wound Vac in place to right groin.)

## 2018-08-18 NOTE — ANESTHESIA PROCEDURE NOTES
Airway  Urgency: elective    Airway not difficult    General Information and Staff    Patient location during procedure: OR  CRNA: DALE EPPERSON    Indications and Patient Condition  Indications for airway management: airway protection    Preoxygenated: yes  MILS maintained throughout  Mask difficulty assessment: 2 - vent by mask + OA or adjuvant +/- NMBA    Final Airway Details  Final airway type: endotracheal airway      Successful airway: ETT  Cuffed: yes   Successful intubation technique: direct laryngoscopy  Endotracheal tube insertion site: oral  Blade: Bran  Blade size: #2  ETT size: 7.0 mm  Cormack-Lehane Classification: grade I - full view of glottis  Placement verified by: chest auscultation and capnometry   Cuff volume (mL): 8  Measured from: lips  ETT to lips (cm): 20  Number of attempts at approach: 1

## 2018-08-18 NOTE — DISCHARGE SUMMARY
Hospital Discharge Summary    Misa Martin  :  1980  MRN:  [id number]    Admit date:  2018  Discharge date:      Admitting Physician:    Berhane Mendoza MD    Discharge Diagnoses:    Active Problems:    Type 1 diabetes mellitus without complication (CMS/HCC)    Abscess    Generalized anxiety disorder    Cigarette nicotine dependence without complication      Hospital Course:   The patient was admitted for the above surgical/medical indication.  Please see admission H&P for further details concerning the admission.  The patient was seen daily and progress noted via daily updates in the progress notes.  The patient improved throughout her stay.  They reached maximum medical improvement and were considered stable for discharge home.  They understand the importance of follow-up concerning any abnormal lab values/x-rays.  All questions were answered to the best of my ability prior to their discharge home.    Patient did directly admitted from Dr. Mendoza's office.  She underwent I&D of abscess with wound VAC placement.  She is anxious to go home.    Medically she is stable for discharge home if surgery is agreeable to change from IV to oral antibiotics.  All pain medication from surgery.    Follow-up next week in Dr. Mendoza's office concerning elevated sugar      Discharge Medications:         Discharge Medications      Continue These Medications      Instructions Start Date   clonazePAM 1 MG tablet  Commonly known as:  KlonoPIN   Take 1 mg by mouth three times daily       Insulin Glargine 300 UNIT/ML solution pen-injector   Inject 24 Units into the skin every morning Indications: TOUJEO       insulin lispro 100 UNIT/ML injection  Commonly known as:  humaLOG   Inject into the skin 3 times daily (before meals) Indications: sliding scale - USUALLY 4 UNITS       ondansetron ODT 8 MG disintegrating tablet  Commonly known as:  ZOFRAN ODT   8 mg, Oral, Every 8 Hours PRN      TRAMADOL HCL PO   50 mg, Oral, 2  Times Daily PRN             Consults: Dr. Ochoa- general surgery    Significant Diagnostic Studies:      EKG: normal EKG, normal sinus rhythm, unchanged from previous tracings.      Treatments:   I&D of abscess with wound VAC placement and IV antibiotics    Disposition:   Home  Follow up with Berhane Mendoza MD  early next week to review sugars..    Signed:  Brock Angel MD   8/18/2018, 8:19 AM

## 2018-08-18 NOTE — H&P
History and Physical      CHIEF COMPLAINT:  Abscess on leg    Reason for Admission:  I&D of abscess    History Obtained From: Patient    HISTORY OF PRESENT ILLNESS:      The patient is a 37 y.o. female with significant past medical history of type I diabetes who presents with recurrent abscess on leg.  Apparently the patient was seen in Dr. Mendoza's office on day of admission and elected to be admitted for I&D of abscess.  She had seen Dr. Gill before for previous abscess.  Now currently admitted for IV antibiotics and drainage of abscess.  Pain rated 3-4 out of 10.  No other precipitating or relieving factors outside of her diabetes and smoking.    Past Medical History:    Past Medical History:   Diagnosis Date   • Allergic    • Anxiety    • Arthritis    • Cancer (CMS/HCC)    • Colitis    • Depression    • Diabetes mellitus type I (CMS/HCC)    • Gallbladder abscess        Past Surgical History:    Past Surgical History:   Procedure Laterality Date   • CHOLECYSTECTOMY     • HYSTERECTOMY         Medications Prior to Admission:    Prescriptions Prior to Admission   Medication Sig Dispense Refill Last Dose   • clonazePAM (KlonoPIN) 1 MG tablet Take 1 mg by mouth three times daily    Taking   • Insulin Glargine 300 UNIT/ML solution pen-injector Inject 24 Units into the skin every morning Indications: TOUJEO       • insulin lispro (HumaLOG) 100 UNIT/ML injection Inject into the skin 3 times daily (before meals) Indications: sliding scale - USUALLY 4 UNITS    Taking   • ondansetron ODT (ZOFRAN ODT) 8 MG disintegrating tablet Take 1 tablet by mouth Every 8 (Eight) Hours As Needed for Nausea or Vomiting. 12 tablet 0    • TRAMADOL HCL PO Take 50 mg by mouth 2 (Two) Times a Day As Needed (PRN moderate pain).          Allergies:  Cefaclor; Cephalexin; Penicillins; and Sulfa antibiotics    Social History:   Social History     Social History   • Marital status:      Spouse name: N/A   • Number of children: N/A   • Years  of education: N/A     Occupational History   • Not on file.     Social History Main Topics   • Smoking status: Current Every Day Smoker     Packs/day: 0.50     Years: 20.00     Types: Cigarettes   • Smokeless tobacco: Not on file   • Alcohol use No   • Drug use: Unknown   • Sexual activity: Yes     Other Topics Concern   • Not on file     Social History Narrative   • No narrative on file       Family History:   Family History   Problem Relation Age of Onset   • Cancer Mother    • Diabetes Mother    • Cancer Father    • Heart disease Father    • Lung disease Father        REVIEW OF SYSTEMS:    CONSTITUTIONAL:  Negative for anorexia, chills, fevers, night sweats and weight loss  EYES:  negative for eye dryness, icterus and redness  HEENT:   negative for dental problems, epistaxis, facial trauma and thrush  RESPIRATORY:  negative for chest tightness, cough, dyspnea on exertion, pneumonia and sputum  CARDIOVASCULAR: negative for chest pain, dyspnea, exertional chest pressure/discomfort, irregular heart beat, palpitations, paroxysmal nocturnal dyspnea and syncope  GASTROINTESTINAL:  negative for abdominal pain, hematemesis, jaundice, melena and rectal bleeding  MUSCULOSKELETAL:  negative for muscle weakness, myalgias and neck pain  NEUROLOGICAL:   negative for dizziness, headaches, seizures, speech problems, tremors and vertigo  INTEGUMENT: Wound VAC in place and groin area status post I&D with good pulses noted distally    Vital Signs   Temp:  [98 °F (36.7 °C)-99.1 °F (37.3 °C)] 98.2 °F (36.8 °C)  Heart Rate:  [57-97] 75  Resp:  [14-18] 16  BP: ()/(50-64) 98/50          Physical Exam:  Constitutional: The patient is oriented to person, place, and time. They appear well-developed.   HEENT:   Head: Normocephalic and atraumatic.   Eyes: Pupils are equal, round, and reactive to light.   Neck: Neck supple without masses or carotid bruit.  Cardiovascular: Regular rhythm and normal heart sounds.    Pulmonary/Chest:  Effort normal and breath sounds normal. CTAB  Abdominal: Soft. Bowel sounds are normal. There is  no distension or  Tenderness appreciated. There is no rebound and no guarding.   Musculoskeletal: Normal range of motio Wound VAC in place and groin area status post I&D with good pulses noted distally  There is  no edema or tenderness.   Neurological: Pt is alert and oriented to person, place, and time. They have normal reflexes. CN 2-12 appear grossly intact  Skin:   Results Review:   I reviewed the patient's new imaging results and agree with the interpretation.    DATA:  Lab Results (last 24 hours)     Procedure Component Value Units Date/Time    Comprehensive Metabolic Panel [[id number]]  (Abnormal) Collected:  08/18/18 0600    Specimen:  Blood Updated:  08/18/18 0658     Glucose 394 (H) mg/dL      BUN 11 mg/dL      Creatinine 0.58 mg/dL      Sodium 137 mmol/L      Potassium 4.3 mmol/L      Chloride 104 mmol/L      CO2 23.0 (L) mmol/L      Calcium 8.3 (L) mg/dL      Total Protein 6.0 (L) g/dL      Albumin 3.40 (L) g/dL      ALT (SGPT) 68 (H) U/L      AST (SGOT) 82 (H) U/L      Alkaline Phosphatase 79 U/L      Total Bilirubin 0.4 mg/dL      eGFR Non African Amer 116 mL/min/1.73      Globulin 2.6 gm/dL      A/G Ratio 1.3 g/dL      BUN/Creatinine Ratio 19.0     Anion Gap 10.0 mmol/L     CBC (No Diff) [[id number]]  (Abnormal) Collected:  08/18/18 0600    Specimen:  Blood Updated:  08/18/18 0641     WBC 8.55 10*3/mm3      RBC 4.01 (L) 10*6/mm3      Hemoglobin 12.3 g/dL      Hematocrit 35.0 (L) %      MCV 87.3 fL      MCH 30.7 pg      MCHC 35.1 g/dL      RDW 11.9 (L) %      RDW-SD 38.1 (L) fl      MPV 10.1 fL      Platelets 273 10*3/mm3     POC Glucose Once [[id number]]  (Abnormal) Collected:  08/18/18 0247    Specimen:  Blood Updated:  08/18/18 0307     Glucose 297 (H) mg/dL      Comment: : [id number] Gary HeatherMeter ID: [id number]       POC Glucose Once [[id number]]  (Abnormal) Collected:  08/17/18 2021     Specimen:  Blood Updated:  08/17/18 2033     Glucose 173 (H) mg/dL      Comment: : [id number] Oumar KathyMeter ID: [id number]       Wound Culture - Wound, Groin, right [[id number]] Collected:  08/17/18 1836    Specimen:  Wound from Groin, right Updated:  08/17/18 1958    POC Glucose Once [[id number]]  (Normal) Collected:  08/17/18 1944    Specimen:  Blood Updated:  08/17/18 1957     Glucose 82 mg/dL      Comment: : [id number] Oumar KathyMeter ID: [id number]       POC Glucose Once [[id number]]  (Normal) Collected:  08/17/18 1902    Specimen:  Blood Updated:  08/17/18 1932     Glucose 96 mg/dL      Comment: : [id number] Avel KimberlyMeter ID: [id number]       POC Glucose Once [[id number]]  (Abnormal) Collected:  08/17/18 1812    Specimen:  Blood Updated:  08/17/18 1823     Glucose 146 (H) mg/dL      Comment: : [id number] Uribe ToniMeter ID: [id number]       POC Glucose Once [[id number]]  (Abnormal) Collected:  08/17/18 1748    Specimen:  Blood Updated:  08/17/18 1759     Glucose 65 (L) mg/dL      Comment: : [id number] Uribe ToniMeter ID: [id number]       POC Glucose Once [[id number]]  (Abnormal) Collected:  08/17/18 1650    Specimen:  Blood Updated:  08/17/18 1727     Glucose 139 (H) mg/dL      Comment: : [id number] Sivakumar CallaMeter ID: [id number]       POC Glucose Once [[id number]]  (Abnormal) Collected:  08/17/18 1627    Specimen:  Blood Updated:  08/17/18 1648     Glucose 58 (L) mg/dL      Comment: : [id number] Sivakumar CallaMeter ID: [id number]       POC Glucose Once [[id number]]  (Abnormal) Collected:  08/17/18 1332    Specimen:  Blood Updated:  08/17/18 1343     Glucose 369 (H) mg/dL      Comment: : [id number] Sivakumar CallaMeter ID: [id number]       Comprehensive Metabolic Panel [[id number]]  (Abnormal) Collected:  08/17/18 1239    Specimen:  Blood Updated:  08/17/18 1302     Glucose 398 (H) mg/dL      BUN 9 mg/dL      Creatinine 0.59 mg/dL      Sodium 138 mmol/L      Potassium 4.1  mmol/L      Chloride 100 mmol/L      CO2 28.0 mmol/L      Calcium 9.1 mg/dL      Total Protein 6.9 g/dL      Albumin 4.30 g/dL      ALT (SGPT) 21 U/L      AST (SGOT) 16 U/L      Alkaline Phosphatase 82 U/L      Total Bilirubin 0.6 mg/dL      eGFR Non African Amer 113 mL/min/1.73      Globulin 2.6 gm/dL      A/G Ratio 1.7 g/dL      BUN/Creatinine Ratio 15.3     Anion Gap 10.0 mmol/L     Protime-INR [[id number]]  (Normal) Collected:  08/17/18 1239    Specimen:  Blood Updated:  08/17/18 1257     Protime 12.5 Seconds      INR 0.91    aPTT [[id number]]  (Normal) Collected:  08/17/18 1239    Specimen:  Blood Updated:  08/17/18 1257     PTT 26.3 seconds     CBC (No Diff) [[id number]]  (Abnormal) Collected:  08/17/18 1239    Specimen:  Blood Updated:  08/17/18 1248     WBC 9.38 10*3/mm3      RBC 4.15 (L) 10*6/mm3      Hemoglobin 12.5 g/dL      Hematocrit 35.6 (L) %      MCV 85.8 fL      MCH 30.1 pg      MCHC 35.1 g/dL      RDW 11.7 (L) %      RDW-SD 36.6 (L) fl      MPV 9.7 fL      Platelets 275 10*3/mm3         Imaging Results (last 24 hours)     ** No results found for the last 24 hours. **            ASSESSMENT AND PLAN:      1.   Active Problems:    Abscess    Uncontrolled type 2 diabetes mellitus with complication, with long-term current use of insulin (CMS/[facility])    Generalized anxiety disorder    Cigarette nicotine dependence without complication  Type I diabetes poor control secondary to dietary medical noncompliance    She is currently admitted for I&D of abscess and IV vancomycin with wound VAC placement.      Brock Angel MD  8:09 AM 8/18/2018

## 2018-08-18 NOTE — ANESTHESIA POSTPROCEDURE EVALUATION
Patient: Misa Martin    Procedure Summary     Date:  08/17/18 Room / Location:  [facility] OR  /  [facility] OR    Anesthesia Start:  1857 Anesthesia Stop:  1940    Procedure:  INCISION AND DRAINAGE RIGHT INGUINAL ABSCESS WITH APPLICATION OF WOUND VAC (Right Groin) Diagnosis:      Surgeon:  Balaji Ochoa MD Provider:  Lennox Cobb CRNA    Anesthesia Type:  general ASA Status:  3          Anesthesia Type: general  Last vitals  BP   97/58 (08/17/18 2040)   Temp   98.8 °F (37.1 °C) (08/17/18 2040)   Pulse   80 (08/17/18 2040)   Resp   16 (08/17/18 2040)     SpO2   97 % (08/17/18 2040)     Post Anesthesia Care and Evaluation    Patient location during evaluation: PACU  Level of consciousness: awake  Pain management: adequate  Airway patency: patent  Anesthetic complications: No anesthetic complications  PONV Status: none  Cardiovascular status: acceptable  Respiratory status: acceptable  Hydration status: acceptable    Comments: Pt discharged per PACU protocol prior to anesthesia eval. See RN notes.  No anesthesia care post op

## 2018-08-18 NOTE — PROGRESS NOTES
LOS: 0 days   Patient Care Team:  Berhane Mendoza MD as PCP - General  Berhane Mendoza MD as PCP - Family Medicine    Chief Complaint:  Right groin infection   Subjective     Subjective     Postoperative day 1 following right groin infection with debridement and wound vacuum placed she wants to go home today the wound VAC has been okayed we will see about obtaining the home wound VAC and discharged to her home on clindamycin   Objective      Objective     Vital Signs  Temp:  [98 °F (36.7 °C)-99.1 °F (37.3 °C)] 98.3 °F (36.8 °C)  Heart Rate:  [57-97] 85  Resp:  [14-18] 18  BP: ()/(49-64) 90/49    Intake & Output (last 3 days)       08/15 0701 - 08/16 0700 08/16 0701 - 08/17 0700 08/17 0701 - 08/18 0700 08/18 0701 - 08/19 0700    P.O.   360     I.V. (mL/kg)   1783 (37.1) 279 (5.8)    IV Piggyback   25     Total Intake(mL/kg)   2168 (45.1) 279 (5.8)    Urine (mL/kg/hr)   2425     Wound    25    Total Output     2425 25    Net     -257 +254                  Physical Exam:     General Appearance:    Alert, cooperative, in no acute distress   Lungs:     Clear to auscultation,respirations regular, even and                  unlabored    Heart:    Regular rhythm and normal rate, normal S1 and S2, no            murmur, no gallop, no rub, no click   Chest Wall:    No abnormalities observed   Abdomen:       The incision in the right groin is clean and dry there is no drainage no erythema the wound VAC is in place.  White count is 8 electrolytes within normal limits glucose still elevated at 394.          Results Review:     I reviewed the patient's new clinical results.  I reviewed the patient's new imaging results and agree with the interpretation.      Results from last 7 days  Lab Units 08/18/18  0600 08/17/18  1239   WBC 10*3/mm3 8.55 9.38   HEMOGLOBIN g/dL 12.3 12.5   HEMATOCRIT % 35.0* 35.6*   PLATELETS 10*3/mm3 273 275          Results from last 7 days  Lab Units 08/18/18  0600 08/17/18  1239    SODIUM mmol/L 137 138   POTASSIUM mmol/L 4.3 4.1   CHLORIDE mmol/L 104 100   CO2 mmol/L 23.0* 28.0   BUN mg/dL 11 9   CREATININE mg/dL 0.58 0.59   CALCIUM mg/dL 8.3* 9.1   BILIRUBIN mg/dL 0.4 0.6   ALK PHOS U/L 79 82   ALT (SGPT) U/L 68* 21   AST (SGOT) U/L 82* 16   GLUCOSE mg/dL 394* 398*       Assessment/Plan     Assessment/Plan     Active Problems:    Type 1 diabetes mellitus without complication (CMS/HCC)    Abscess    Generalized anxiety disorder    Cigarette nicotine dependence without complication      Presently doing well following the drainage of the right groin abscess, we will look toward discharging today if there is a wound VAC for her she will be discharged on clindamycin and follow-up with the wound care on Monday follow-up with me in 3 weeks.    Balaji Ochoa MD  08/18/18  10:47 AM      Time: time spent with patient 15 minutes     EMR Dragon/Transcription disclaimer: Much of this encounter note is an electronic transcription/translation of spoken language to printed text. The electronic translation of spoken language may permit erroneous, or at times, nonsensical words or phrases to be inadvertently transcribed; although I have reviewed the note for such errors, some may still exist.

## 2018-08-18 NOTE — OP NOTE
GROIN ABSCESS INCISION AND DRAINAGE  Procedure Note    Misa Martin  8/17/2018    Pre-op Diagnosis:   Right infra inguinal ligament abscess  Post-op Diagnosis:     Right infra inguinal abscess    Procedure/CPT® Codes:      Procedure(s):  INCISION AND DRAINAGE RIGHT INGUINAL ABSCESS WITH APPLICATION OF WOUND VAC    Surgeon(s):  Balaji Ochoa MD    Anesthesia: General    Staff:   Circulator: José Zuniga RN  Scrub Person: JianAfshan bedoya; Yanet Aguilar    Estimated Blood Loss: 25 mL    Specimens:                ID Type Source Tests Collected by Time   1 : RIGHT INGUINAL ABSCESS Wound Groin, right WOUND CULTURE Balaji Ochoa MD 8/17/2018 1836   A :  Tissue Groin, right TISSUE PATHOLOGY EXAM Balaji Ochoa MD 8/17/2018 1918         Drains:  A wound VAC was applied over the open area that was one and half centimeters wide and 2-1/2 cm long    Indications: Misa Martin is a 37-year-old female who has had a previous abscess in her buttock area that was drained by Dr. Gill pain now has a progressive bulge in her right infra inguinal crease.  The area is approximately 5 cm long 2-1/2 cm wide 4 bulb was erythematous and fluctuant.  She needs to have this area opened and drained on the way to the hospital after being admitted by her PCP she stopped in consenting to eat and as result we will need to wait until this has processed and we will look toward opening of the area drainage and a wound vacuum placement.  She is aware the procedure its risk and benefits and with full knowledge of this an apparent understanding she gives her informed consent for surgery.    Findings: Opening of a abscess cavity in the right infra inguinal crease abscess the area was cultured the top was unroofed and the open area was one and a half centimeters wide and 2-1/2 cm long and was debrided and fulgurated over the chronic granulation tissue with a wound vacuum applied      Complications: There were no complications      Procedure: Patient was placed in the supine position in the surgical suite and after adequate prepping and draping had been completed, 1 g of vancomycin, and a timeout accomplished excision of the overlying skin over this abscess was completed the abscess cavity was drained cultures were obtained and the chronic granulation tissue that was present was debrided and also fulgurated.  The opening was one and a half centimeters wide 2 and half centimeters long and this area was opened and debrided fulgurated and a wound vacuum was applied the depths of the area was probably a half centimeter at most with the width being one and half centimeters and the length tube and a half centimeters.  A wound vacuum was applied with no leak noted.    Balaji Ochoa MD     Date: 8/17/2018  Time: 7:39 PM    EMR Dragon/Transcription disclaimer: Much of this encounter note is an electronic transcription/translation of spoken language to printed text. The electronic translation of spoken language may permit erroneous, or at times, nonsensical words or phrases to be inadvertently transcribed; although I have reviewed the note for such errors, some may still exist.

## 2018-08-20 ENCOUNTER — OFFICE VISIT (OUTPATIENT)
Dept: WOUND CARE | Facility: HOSPITAL | Age: 38
End: 2018-08-20

## 2018-08-20 PROCEDURE — G0463 HOSPITAL OUTPT CLINIC VISIT: HCPCS

## 2018-08-21 LAB
BACTERIA SPEC AEROBE CULT: ABNORMAL
GRAM STN SPEC: ABNORMAL
GRAM STN SPEC: ABNORMAL
LAB AP CASE REPORT: NORMAL
PATH REPORT.FINAL DX SPEC: NORMAL
PATH REPORT.GROSS SPEC: NORMAL

## 2018-08-29 ENCOUNTER — APPOINTMENT (OUTPATIENT)
Dept: WOUND CARE | Facility: HOSPITAL | Age: 38
End: 2018-08-29

## 2018-08-31 ENCOUNTER — OFFICE VISIT (OUTPATIENT)
Dept: WOUND CARE | Facility: HOSPITAL | Age: 38
End: 2018-08-31

## 2018-09-07 ENCOUNTER — OFFICE VISIT (OUTPATIENT)
Dept: WOUND CARE | Facility: HOSPITAL | Age: 38
End: 2018-09-07

## 2019-01-28 ENCOUNTER — OFFICE VISIT (OUTPATIENT)
Dept: URGENT CARE | Age: 39
End: 2019-01-28
Payer: COMMERCIAL

## 2019-01-28 VITALS
DIASTOLIC BLOOD PRESSURE: 77 MMHG | TEMPERATURE: 98.6 F | RESPIRATION RATE: 16 BRPM | BODY MASS INDEX: 17.04 KG/M2 | HEIGHT: 66 IN | HEART RATE: 90 BPM | WEIGHT: 106 LBS | OXYGEN SATURATION: 99 % | SYSTOLIC BLOOD PRESSURE: 115 MMHG

## 2019-01-28 DIAGNOSIS — Z20.828 EXPOSURE TO INFLUENZA: ICD-10-CM

## 2019-01-28 DIAGNOSIS — R05.9 COUGH: ICD-10-CM

## 2019-01-28 DIAGNOSIS — J06.9 URI, ACUTE: Primary | ICD-10-CM

## 2019-01-28 LAB
INFLUENZA A ANTIBODY: NEGATIVE
INFLUENZA B ANTIBODY: NEGATIVE
S PYO AG THROAT QL: NORMAL

## 2019-01-28 PROCEDURE — 4004F PT TOBACCO SCREEN RCVD TLK: CPT | Performed by: SPECIALIST

## 2019-01-28 PROCEDURE — G8427 DOCREV CUR MEDS BY ELIG CLIN: HCPCS | Performed by: SPECIALIST

## 2019-01-28 PROCEDURE — 87804 INFLUENZA ASSAY W/OPTIC: CPT | Performed by: SPECIALIST

## 2019-01-28 PROCEDURE — G8419 CALC BMI OUT NRM PARAM NOF/U: HCPCS | Performed by: SPECIALIST

## 2019-01-28 PROCEDURE — 87880 STREP A ASSAY W/OPTIC: CPT | Performed by: SPECIALIST

## 2019-01-28 PROCEDURE — G8484 FLU IMMUNIZE NO ADMIN: HCPCS | Performed by: SPECIALIST

## 2019-01-28 PROCEDURE — 99213 OFFICE O/P EST LOW 20 MIN: CPT | Performed by: SPECIALIST

## 2019-01-28 RX ORDER — DULOXETIN HYDROCHLORIDE 20 MG/1
CAPSULE, DELAYED RELEASE ORAL
COMMUNITY
Start: 2019-01-24

## 2019-01-28 RX ORDER — AZITHROMYCIN 250 MG/1
TABLET, FILM COATED ORAL
Qty: 1 PACKET | Refills: 0 | Status: SHIPPED | OUTPATIENT
Start: 2019-01-28 | End: 2019-02-07

## 2019-01-28 RX ORDER — OSELTAMIVIR PHOSPHATE 75 MG/1
75 CAPSULE ORAL 2 TIMES DAILY
Qty: 20 CAPSULE | Refills: 0 | Status: SHIPPED | OUTPATIENT
Start: 2019-01-28 | End: 2019-02-07

## 2019-01-28 ASSESSMENT — ENCOUNTER SYMPTOMS
COUGH: 1
SORE THROAT: 1
ABDOMINAL PAIN: 1
SINUS PRESSURE: 1

## 2019-01-29 ENCOUNTER — TELEPHONE (OUTPATIENT)
Dept: URGENT CARE | Age: 39
End: 2019-01-29

## 2019-01-29 RX ORDER — PROMETHAZINE HYDROCHLORIDE 25 MG/1
25 TABLET ORAL EVERY 6 HOURS PRN
Qty: 20 TABLET | Refills: 0 | Status: SHIPPED | OUTPATIENT
Start: 2019-01-29 | End: 2019-02-05

## 2019-09-12 ENCOUNTER — OFFICE VISIT (OUTPATIENT)
Dept: URGENT CARE | Age: 39
End: 2019-09-12
Payer: MEDICARE

## 2019-09-12 VITALS
TEMPERATURE: 98.6 F | HEART RATE: 86 BPM | DIASTOLIC BLOOD PRESSURE: 71 MMHG | BODY MASS INDEX: 16.14 KG/M2 | SYSTOLIC BLOOD PRESSURE: 105 MMHG | WEIGHT: 100 LBS | OXYGEN SATURATION: 98 %

## 2019-09-12 DIAGNOSIS — J01.90 ACUTE SINUSITIS, RECURRENCE NOT SPECIFIED, UNSPECIFIED LOCATION: Primary | ICD-10-CM

## 2019-09-12 DIAGNOSIS — F17.200 SMOKER: ICD-10-CM

## 2019-09-12 DIAGNOSIS — R50.9 FEVER, UNSPECIFIED FEVER CAUSE: ICD-10-CM

## 2019-09-12 LAB — S PYO AG THROAT QL: NORMAL

## 2019-09-12 PROCEDURE — 87880 STREP A ASSAY W/OPTIC: CPT | Performed by: SPECIALIST

## 2019-09-12 PROCEDURE — G8419 CALC BMI OUT NRM PARAM NOF/U: HCPCS | Performed by: SPECIALIST

## 2019-09-12 PROCEDURE — 99213 OFFICE O/P EST LOW 20 MIN: CPT | Performed by: SPECIALIST

## 2019-09-12 PROCEDURE — 4004F PT TOBACCO SCREEN RCVD TLK: CPT | Performed by: SPECIALIST

## 2019-09-12 PROCEDURE — G8427 DOCREV CUR MEDS BY ELIG CLIN: HCPCS | Performed by: SPECIALIST

## 2019-09-12 RX ORDER — LEVOFLOXACIN 500 MG/1
500 TABLET, FILM COATED ORAL DAILY
Qty: 7 TABLET | Refills: 0 | Status: SHIPPED | OUTPATIENT
Start: 2019-09-12 | End: 2019-09-19

## 2019-09-12 ASSESSMENT — ENCOUNTER SYMPTOMS
EYES NEGATIVE: 1
SINUS PAIN: 1
SORE THROAT: 1
SINUS PRESSURE: 1
COUGH: 1

## 2019-09-12 NOTE — PATIENT INSTRUCTIONS
Patient Education        Sinusitis: Care Instructions  Your Care Instructions    Sinusitis is an infection of the lining of the sinus cavities in your head. Sinusitis often follows a cold. It causes pain and pressure in your head and face. In most cases, sinusitis gets better on its own in 1 to 2 weeks. But some mild symptoms may last for several weeks. Sometimes antibiotics are needed. Follow-up care is a key part of your treatment and safety. Be sure to make and go to all appointments, and call your doctor if you are having problems. It's also a good idea to know your test results and keep a list of the medicines you take. How can you care for yourself at home? · Take an over-the-counter pain medicine, such as acetaminophen (Tylenol), ibuprofen (Advil, Motrin), or naproxen (Aleve). Read and follow all instructions on the label. · If the doctor prescribed antibiotics, take them as directed. Do not stop taking them just because you feel better. You need to take the full course of antibiotics. · Be careful when taking over-the-counter cold or flu medicines and Tylenol at the same time. Many of these medicines have acetaminophen, which is Tylenol. Read the labels to make sure that you are not taking more than the recommended dose. Too much acetaminophen (Tylenol) can be harmful. · Breathe warm, moist air from a steamy shower, a hot bath, or a sink filled with hot water. Avoid cold, dry air. Using a humidifier in your home may help. Follow the directions for cleaning the machine. · Use saline (saltwater) nasal washes to help keep your nasal passages open and wash out mucus and bacteria. You can buy saline nose drops at a grocery store or drugstore. Or you can make your own at home by adding 1 teaspoon of salt and 1 teaspoon of baking soda to 2 cups of distilled water. If you make your own, fill a bulb syringe with the solution, insert the tip into your nostril, and squeeze gently. Edwin Rough your nose.   · Put a hot, wet towel or a warm gel pack on your face 3 or 4 times a day for 5 to 10 minutes each time. · Try a decongestant nasal spray like oxymetazoline (Afrin). Do not use it for more than 3 days in a row. Using it for more than 3 days can make your congestion worse. When should you call for help? Call your doctor now or seek immediate medical care if:    · You have new or worse swelling or redness in your face or around your eyes.     · You have a new or higher fever.    Watch closely for changes in your health, and be sure to contact your doctor if:    · You have new or worse facial pain.     · The mucus from your nose becomes thicker (like pus) or has new blood in it.     · You are not getting better as expected. Where can you learn more? Go to [email or web address] and sign in to your [employer] account. Enter S302 in the CheckBonus box to learn more about \"Sinusitis: Care Instructions. \"     If you do not have an account, please click on the \"Sign Up Now\" link. Current as of: October 21, 2018  Content Version: 12.1  © 8609-3918 [employer]wise, Incorporated. Care instructions adapted under license by [facility] ([facility]). If you have questions about a medical condition or this instruction, always ask your healthcare professional. [address] any warranty or liability for your use of this information.

## 2019-09-23 ENCOUNTER — HOSPITAL ENCOUNTER (EMERGENCY)
Age: 39
Discharge: HOME | End: 2019-09-23
Attending: EMERGENCY MEDICINE
Payer: MEDICARE

## 2019-09-23 VITALS
BODY MASS INDEX: 15.66 KG/M2 | HEIGHT: 65 IN | RESPIRATION RATE: 20 BRPM | TEMPERATURE: 98.6 F | HEART RATE: 89 BPM | WEIGHT: 94 LBS | OXYGEN SATURATION: 95 % | SYSTOLIC BLOOD PRESSURE: 105 MMHG | DIASTOLIC BLOOD PRESSURE: 61 MMHG

## 2019-09-23 DIAGNOSIS — R19.7 DIARRHEA, UNSPECIFIED TYPE: Primary | ICD-10-CM

## 2019-09-23 DIAGNOSIS — R73.9 HYPERGLYCEMIA: ICD-10-CM

## 2019-09-23 DIAGNOSIS — E87.6 HYPOKALEMIA: ICD-10-CM

## 2019-09-23 DIAGNOSIS — N17.9 AKI (ACUTE KIDNEY INJURY) (HCC): ICD-10-CM

## 2019-09-23 DIAGNOSIS — E83.51 HYPOCALCEMIA: ICD-10-CM

## 2019-09-23 LAB
ALBUMIN SERPL-MCNC: 4.2 G/DL (ref 3.5–5.2)
ALP BLD-CCNC: 71 U/L (ref 35–104)
ALT SERPL-CCNC: 6 U/L (ref 5–33)
ANION GAP SERPL CALCULATED.3IONS-SCNC: 15 MMOL/L (ref 7–19)
ANION GAP SERPL CALCULATED.3IONS-SCNC: 21 MMOL/L (ref 7–19)
AST SERPL-CCNC: 7 U/L (ref 5–32)
BILIRUB SERPL-MCNC: 0.5 MG/DL (ref 0.2–1.2)
BILIRUBIN URINE: NEGATIVE
BLOOD, URINE: NEGATIVE
BUN BLDV-MCNC: 11 MG/DL (ref 6–20)
BUN BLDV-MCNC: 13 MG/DL (ref 6–20)
C DIFF TOXIN/ANTIGEN: NORMAL
CALCIUM IONIZED: 0.99 MMOL/L (ref 1.12–1.32)
CALCIUM SERPL-MCNC: 7.4 MG/DL (ref 8.6–10)
CALCIUM SERPL-MCNC: 8.5 MG/DL (ref 8.6–10)
CHLORIDE BLD-SCNC: 100 MMOL/L (ref 98–111)
CHLORIDE BLD-SCNC: 92 MMOL/L (ref 98–111)
CLARITY: CLEAR
CO2: 21 MMOL/L (ref 22–29)
CO2: 22 MMOL/L (ref 22–29)
COLOR: YELLOW
CREAT SERPL-MCNC: 0.8 MG/DL (ref 0.5–0.9)
CREAT SERPL-MCNC: 1 MG/DL (ref 0.5–0.9)
GFR NON-AFRICAN AMERICAN: >60
GFR NON-AFRICAN AMERICAN: >60
GLUCOSE BLD-MCNC: 222 MG/DL (ref 70–99)
GLUCOSE BLD-MCNC: 255 MG/DL (ref 74–109)
GLUCOSE BLD-MCNC: 457 MG/DL (ref 70–99)
GLUCOSE BLD-MCNC: 508 MG/DL (ref 74–109)
GLUCOSE URINE: >=1000 MG/DL
HCT VFR BLD CALC: 45.5 % (ref 37–47)
HEMOGLOBIN: 15.9 G/DL (ref 12–16)
KETONES, URINE: 40 MG/DL
LEUKOCYTE ESTERASE, URINE: NEGATIVE
LIPASE: 4 U/L (ref 13–60)
MCH RBC QN AUTO: 31.1 PG (ref 27–31)
MCHC RBC AUTO-ENTMCNC: 34.9 G/DL (ref 33–37)
MCV RBC AUTO: 88.9 FL (ref 81–99)
NITRITE, URINE: NEGATIVE
PDW BLD-RTO: 12.2 % (ref 11.5–14.5)
PERFORMED ON: ABNORMAL
PERFORMED ON: ABNORMAL
PH UA: 6 (ref 5–8)
PLATELET # BLD: 158 K/UL (ref 130–400)
PMV BLD AUTO: 10.7 FL (ref 9.4–12.3)
POTASSIUM REFLEX MAGNESIUM: 3.9 MMOL/L (ref 3.5–5)
POTASSIUM SERPL-SCNC: 2.9 MMOL/L (ref 3.5–5)
POTASSIUM SERPL-SCNC: 3.5 MMOL/L (ref 3.5–5)
PROTEIN UA: NEGATIVE MG/DL
RBC # BLD: 5.12 M/UL (ref 4.2–5.4)
REASON FOR REJECTION: NORMAL
REJECTED TEST: NORMAL
SODIUM BLD-SCNC: 134 MMOL/L (ref 136–145)
SODIUM BLD-SCNC: 137 MMOL/L (ref 136–145)
SPECIFIC GRAVITY UA: 1.04 (ref 1–1.03)
TOTAL PROTEIN: 6.9 G/DL (ref 6.6–8.7)
URINE REFLEX TO CULTURE: ABNORMAL
UROBILINOGEN, URINE: 0.2 E.U./DL
WBC # BLD: 10.4 K/UL (ref 4.8–10.8)

## 2019-09-23 PROCEDURE — 2580000003 HC RX 258: Performed by: EMERGENCY MEDICINE

## 2019-09-23 PROCEDURE — 96365 THER/PROPH/DIAG IV INF INIT: CPT

## 2019-09-23 PROCEDURE — 87045 FECES CULTURE AEROBIC BACT: CPT

## 2019-09-23 PROCEDURE — 84132 ASSAY OF SERUM POTASSIUM: CPT

## 2019-09-23 PROCEDURE — 87449 NOS EACH ORGANISM AG IA: CPT

## 2019-09-23 PROCEDURE — 6370000000 HC RX 637 (ALT 250 FOR IP): Performed by: EMERGENCY MEDICINE

## 2019-09-23 PROCEDURE — 83690 ASSAY OF LIPASE: CPT

## 2019-09-23 PROCEDURE — 87324 CLOSTRIDIUM AG IA: CPT

## 2019-09-23 PROCEDURE — 87899 AGENT NOS ASSAY W/OPTIC: CPT

## 2019-09-23 PROCEDURE — 36415 COLL VENOUS BLD VENIPUNCTURE: CPT

## 2019-09-23 PROCEDURE — 99283 EMERGENCY DEPT VISIT LOW MDM: CPT

## 2019-09-23 PROCEDURE — 80053 COMPREHEN METABOLIC PANEL: CPT

## 2019-09-23 PROCEDURE — 82948 REAGENT STRIP/BLOOD GLUCOSE: CPT

## 2019-09-23 PROCEDURE — 85027 COMPLETE CBC AUTOMATED: CPT

## 2019-09-23 PROCEDURE — 81003 URINALYSIS AUTO W/O SCOPE: CPT

## 2019-09-23 PROCEDURE — 6360000002 HC RX W HCPCS: Performed by: EMERGENCY MEDICINE

## 2019-09-23 PROCEDURE — 87427 SHIGA-LIKE TOXIN AG IA: CPT

## 2019-09-23 PROCEDURE — 87015 SPECIMEN INFECT AGNT CONCNTJ: CPT

## 2019-09-23 PROCEDURE — 82330 ASSAY OF CALCIUM: CPT

## 2019-09-23 PROCEDURE — 87046 STOOL CULTR AEROBIC BACT EA: CPT

## 2019-09-23 PROCEDURE — 96375 TX/PRO/DX INJ NEW DRUG ADDON: CPT

## 2019-09-23 RX ORDER — 0.9 % SODIUM CHLORIDE 0.9 %
1000 INTRAVENOUS SOLUTION INTRAVENOUS ONCE
Status: COMPLETED | OUTPATIENT
Start: 2019-09-23 | End: 2019-09-23

## 2019-09-23 RX ORDER — ONDANSETRON 4 MG/1
4 TABLET, ORALLY DISINTEGRATING ORAL EVERY 8 HOURS PRN
Qty: 12 TABLET | Refills: 0 | Status: SHIPPED | OUTPATIENT
Start: 2019-09-23

## 2019-09-23 RX ORDER — ONDANSETRON 2 MG/ML
4 INJECTION INTRAMUSCULAR; INTRAVENOUS ONCE
Status: COMPLETED | OUTPATIENT
Start: 2019-09-23 | End: 2019-09-23

## 2019-09-23 RX ORDER — POTASSIUM CHLORIDE 20 MEQ/1
40 TABLET, EXTENDED RELEASE ORAL ONCE
Status: COMPLETED | OUTPATIENT
Start: 2019-09-23 | End: 2019-09-23

## 2019-09-23 RX ORDER — POTASSIUM CHLORIDE 7.45 MG/ML
10 INJECTION INTRAVENOUS ONCE
Status: COMPLETED | OUTPATIENT
Start: 2019-09-23 | End: 2019-09-23

## 2019-09-23 RX ADMIN — POTASSIUM CHLORIDE 40 MEQ: 1500 TABLET, EXTENDED RELEASE ORAL at 20:10

## 2019-09-23 RX ADMIN — SODIUM CHLORIDE 1000 ML: 9 INJECTION, SOLUTION INTRAVENOUS at 17:29

## 2019-09-23 RX ADMIN — INSULIN LISPRO 5 UNITS: 100 INJECTION, SOLUTION INTRAVENOUS; SUBCUTANEOUS at 17:31

## 2019-09-23 RX ADMIN — ONDANSETRON 4 MG: 2 INJECTION INTRAMUSCULAR; INTRAVENOUS at 15:12

## 2019-09-23 RX ADMIN — SODIUM CHLORIDE 1000 ML: 9 INJECTION, SOLUTION INTRAVENOUS at 15:12

## 2019-09-23 RX ADMIN — POTASSIUM CHLORIDE 10 MEQ: 7.46 INJECTION, SOLUTION INTRAVENOUS at 20:10

## 2019-09-23 ASSESSMENT — ENCOUNTER SYMPTOMS
DIARRHEA: 1
ABDOMINAL PAIN: 0
SHORTNESS OF BREATH: 0
NAUSEA: 1
BLOOD IN STOOL: 0
EYE PAIN: 0
VOMITING: 0

## 2019-09-26 LAB
CULTURE, STOOL: NORMAL
E COLI SHIGA TOXIN ASSAY: NORMAL

## 2019-11-06 ENCOUNTER — OFFICE VISIT (OUTPATIENT)
Dept: URGENT CARE | Age: 39
End: 2019-11-06
Payer: MEDICARE

## 2019-11-06 VITALS
SYSTOLIC BLOOD PRESSURE: 107 MMHG | DIASTOLIC BLOOD PRESSURE: 70 MMHG | TEMPERATURE: 98.6 F | HEART RATE: 92 BPM | OXYGEN SATURATION: 98 % | BODY MASS INDEX: 16.66 KG/M2 | HEIGHT: 65 IN | RESPIRATION RATE: 18 BRPM | WEIGHT: 100 LBS

## 2019-11-06 DIAGNOSIS — B34.9 VIRAL INFECTION: Primary | ICD-10-CM

## 2019-11-06 DIAGNOSIS — R52 BODY ACHES: ICD-10-CM

## 2019-11-06 LAB
INFLUENZA A ANTIBODY: NEGATIVE
INFLUENZA B ANTIBODY: NEGATIVE
S PYO AG THROAT QL: NORMAL

## 2019-11-06 PROCEDURE — 99213 OFFICE O/P EST LOW 20 MIN: CPT | Performed by: SPECIALIST

## 2019-11-06 PROCEDURE — G8419 CALC BMI OUT NRM PARAM NOF/U: HCPCS | Performed by: SPECIALIST

## 2019-11-06 PROCEDURE — 87880 STREP A ASSAY W/OPTIC: CPT | Performed by: SPECIALIST

## 2019-11-06 PROCEDURE — G8484 FLU IMMUNIZE NO ADMIN: HCPCS | Performed by: SPECIALIST

## 2019-11-06 PROCEDURE — 87804 INFLUENZA ASSAY W/OPTIC: CPT | Performed by: SPECIALIST

## 2019-11-06 PROCEDURE — 4004F PT TOBACCO SCREEN RCVD TLK: CPT | Performed by: SPECIALIST

## 2019-11-06 PROCEDURE — G8427 DOCREV CUR MEDS BY ELIG CLIN: HCPCS | Performed by: SPECIALIST

## 2019-11-06 ASSESSMENT — ENCOUNTER SYMPTOMS: COUGH: 1

## 2020-02-19 LAB
ALBUMIN SERPL-MCNC: 5.1 G/DL (ref 3.5–5.2)
ALP BLD-CCNC: 89 U/L (ref 35–104)
ALT SERPL-CCNC: 33 U/L (ref 5–33)
ANION GAP SERPL CALCULATED.3IONS-SCNC: 18 MMOL/L (ref 7–19)
AST SERPL-CCNC: 24 U/L (ref 5–32)
BASOPHILS ABSOLUTE: 0.1 K/UL (ref 0–0.2)
BASOPHILS RELATIVE PERCENT: 0.7 % (ref 0–1)
BILIRUB SERPL-MCNC: 0.3 MG/DL (ref 0.2–1.2)
BUN BLDV-MCNC: 7 MG/DL (ref 6–20)
CALCIUM SERPL-MCNC: 9.4 MG/DL (ref 8.6–10)
CHLORIDE BLD-SCNC: 92 MMOL/L (ref 98–111)
CO2: 25 MMOL/L (ref 22–29)
CREAT SERPL-MCNC: 0.6 MG/DL (ref 0.5–0.9)
EOSINOPHILS ABSOLUTE: 0.1 K/UL (ref 0–0.6)
EOSINOPHILS RELATIVE PERCENT: 0.8 % (ref 0–5)
GFR NON-AFRICAN AMERICAN: >60
GLUCOSE BLD-MCNC: 505 MG/DL (ref 74–109)
HBA1C MFR BLD: 15.2 % (ref 4–6)
HCT VFR BLD CALC: 46.7 % (ref 37–47)
HEMOGLOBIN: 15.4 G/DL (ref 12–16)
IMMATURE GRANULOCYTES #: 0 K/UL
LYMPHOCYTES ABSOLUTE: 2.2 K/UL (ref 1.1–4.5)
LYMPHOCYTES RELATIVE PERCENT: 31.2 % (ref 20–40)
MCH RBC QN AUTO: 31.1 PG (ref 27–31)
MCHC RBC AUTO-ENTMCNC: 33 G/DL (ref 33–37)
MCV RBC AUTO: 94.3 FL (ref 81–99)
MONOCYTES ABSOLUTE: 0.4 K/UL (ref 0–0.9)
MONOCYTES RELATIVE PERCENT: 5.8 % (ref 0–10)
NEUTROPHILS ABSOLUTE: 4.3 K/UL (ref 1.5–7.5)
NEUTROPHILS RELATIVE PERCENT: 61.2 % (ref 50–65)
PDW BLD-RTO: 12.1 % (ref 11.5–14.5)
PLATELET # BLD: 332 K/UL (ref 130–400)
PMV BLD AUTO: 10.5 FL (ref 9.4–12.3)
POTASSIUM SERPL-SCNC: 5.4 MMOL/L (ref 3.5–5)
RBC # BLD: 4.95 M/UL (ref 4.2–5.4)
SODIUM BLD-SCNC: 135 MMOL/L (ref 136–145)
TOTAL PROTEIN: 7.3 G/DL (ref 6.6–8.7)
TSH REFLEX FT4: 1.15 UIU/ML (ref 0.35–5.5)
VITAMIN B-12: 759 PG/ML (ref 211–946)
WBC # BLD: 7.1 K/UL (ref 4.8–10.8)

## 2020-02-21 LAB
ACETONE SERUM QUANT: <5 MG/DL
C-PEPTIDE: <0.1 NG/ML (ref 0.8–3.5)
ISOPROPANOL BLOOD: <5 MG/DL

## 2020-12-02 PROCEDURE — U0003 INFECTIOUS AGENT DETECTION BY NUCLEIC ACID (DNA OR RNA); SEVERE ACUTE RESPIRATORY SYNDROME CORONAVIRUS 2 (SARS-COV-2) (CORONAVIRUS DISEASE [COVID-19]), AMPLIFIED PROBE TECHNIQUE, MAKING USE OF HIGH THROUGHPUT TECHNOLOGIES AS DESCRIBED BY CMS-2020-01-R: HCPCS | Performed by: NURSE PRACTITIONER

## 2021-01-12 PROCEDURE — U0004 COV-19 TEST NON-CDC HGH THRU: HCPCS | Performed by: FAMILY MEDICINE

## 2021-04-05 LAB
ALBUMIN SERPL-MCNC: 5.3 G/DL (ref 3.5–5.2)
ALP BLD-CCNC: 128 U/L (ref 35–104)
ALT SERPL-CCNC: 94 U/L (ref 5–33)
ANION GAP SERPL CALCULATED.3IONS-SCNC: 15 MMOL/L (ref 7–19)
AST SERPL-CCNC: 46 U/L (ref 5–32)
BASOPHILS ABSOLUTE: 0.1 K/UL (ref 0–0.2)
BASOPHILS RELATIVE PERCENT: 1 % (ref 0–1)
BILIRUB SERPL-MCNC: 0.6 MG/DL (ref 0.2–1.2)
BUN BLDV-MCNC: 11 MG/DL (ref 6–20)
CALCIUM SERPL-MCNC: 9.8 MG/DL (ref 8.6–10)
CHLORIDE BLD-SCNC: 91 MMOL/L (ref 98–111)
CHOLESTEROL, TOTAL: 182 MG/DL (ref 160–199)
CO2: 26 MMOL/L (ref 22–29)
CREAT SERPL-MCNC: 0.6 MG/DL (ref 0.5–0.9)
EOSINOPHILS ABSOLUTE: 0.1 K/UL (ref 0–0.6)
EOSINOPHILS RELATIVE PERCENT: 1.7 % (ref 0–5)
GFR AFRICAN AMERICAN: >59
GFR NON-AFRICAN AMERICAN: >60
GLUCOSE BLD-MCNC: 480 MG/DL (ref 74–109)
HBA1C MFR BLD: 14.3 % (ref 4–6)
HCT VFR BLD CALC: 45.2 % (ref 37–47)
HDLC SERPL-MCNC: 67 MG/DL (ref 65–121)
HEMOGLOBIN: 15.2 G/DL (ref 12–16)
IMMATURE GRANULOCYTES #: 0 K/UL
LDL CHOLESTEROL CALCULATED: 77 MG/DL
LYMPHOCYTES ABSOLUTE: 2.4 K/UL (ref 1.1–4.5)
LYMPHOCYTES RELATIVE PERCENT: 29.4 % (ref 20–40)
MCH RBC QN AUTO: 30.7 PG (ref 27–31)
MCHC RBC AUTO-ENTMCNC: 33.6 G/DL (ref 33–37)
MCV RBC AUTO: 91.3 FL (ref 81–99)
MONOCYTES ABSOLUTE: 0.6 K/UL (ref 0–0.9)
MONOCYTES RELATIVE PERCENT: 6.9 % (ref 0–10)
NEUTROPHILS ABSOLUTE: 5 K/UL (ref 1.5–7.5)
NEUTROPHILS RELATIVE PERCENT: 60.8 % (ref 50–65)
PDW BLD-RTO: 12 % (ref 11.5–14.5)
PLATELET # BLD: 322 K/UL (ref 130–400)
PMV BLD AUTO: 9.8 FL (ref 9.4–12.3)
POTASSIUM SERPL-SCNC: 4.4 MMOL/L (ref 3.5–5)
RBC # BLD: 4.95 M/UL (ref 4.2–5.4)
RHEUMATOID FACTOR: <10 IU/ML
SEDIMENTATION RATE, ERYTHROCYTE: 8 MM/HR (ref 0–20)
SODIUM BLD-SCNC: 132 MMOL/L (ref 136–145)
T4 FREE: 1.65 NG/DL (ref 0.93–1.7)
TOTAL PROTEIN: 8.3 G/DL (ref 6.6–8.7)
TRIGL SERPL-MCNC: 189 MG/DL (ref 0–149)
TSH SERPL DL<=0.05 MIU/L-ACNC: 0.98 UIU/ML (ref 0.27–4.2)
VITAMIN B-12: 1205 PG/ML (ref 211–946)
VITAMIN D 25-HYDROXY: 9.7 NG/ML
WBC # BLD: 8.3 K/UL (ref 4.8–10.8)

## 2021-04-08 LAB — ANA IGG, ELISA: NORMAL

## 2021-04-20 LAB
ALBUMIN SERPL-MCNC: 5 G/DL (ref 3.5–5.2)
ALP BLD-CCNC: 105 U/L (ref 35–104)
ALT SERPL-CCNC: 46 U/L (ref 5–33)
AST SERPL-CCNC: 29 U/L (ref 5–32)
BILIRUB SERPL-MCNC: 0.7 MG/DL (ref 0.2–1.2)
BILIRUBIN DIRECT: 0.2 MG/DL (ref 0–0.3)
BILIRUBIN, INDIRECT: 0.5 MG/DL (ref 0.1–1)
TOTAL PROTEIN: 8.1 G/DL (ref 6.6–8.7)

## 2021-04-21 LAB
HCV QNT BY NAAT IU/ML: NOT DETECTED IU/ML
HCV QNT BY NAAT LOG IU/ML: NOT DETECTED LOG IU/ML
INTERPRETATION: NOT DETECTED

## 2021-04-28 ENCOUNTER — APPOINTMENT (OUTPATIENT)
Dept: VACCINE CLINIC | Facility: HOSPITAL | Age: 41
End: 2021-04-28

## 2021-05-12 ENCOUNTER — HOSPITAL ENCOUNTER (EMERGENCY)
Facility: HOSPITAL | Age: 41
Discharge: HOME | End: 2021-05-13
Attending: EMERGENCY MEDICINE | Admitting: EMERGENCY MEDICINE

## 2021-05-12 ENCOUNTER — APPOINTMENT (OUTPATIENT)
Dept: GENERAL RADIOLOGY | Facility: HOSPITAL | Age: 41
End: 2021-05-12

## 2021-05-12 DIAGNOSIS — M79.7 FIBROMYALGIA: ICD-10-CM

## 2021-05-12 DIAGNOSIS — R73.9 HYPERGLYCEMIA: Primary | ICD-10-CM

## 2021-05-12 LAB
ACETONE BLD QL: ABNORMAL
ALBUMIN SERPL-MCNC: 4.2 G/DL (ref 3.5–5.2)
ALBUMIN/GLOB SERPL: 1.9 G/DL
ALP SERPL-CCNC: 90 U/L (ref 39–117)
ALT SERPL W P-5'-P-CCNC: 52 U/L (ref 1–33)
ANION GAP SERPL CALCULATED.3IONS-SCNC: 15 MMOL/L (ref 5–15)
AST SERPL-CCNC: 50 U/L (ref 1–32)
ATMOSPHERIC PRESS: 759 MMHG
BASE EXCESS BLDV CALC-SCNC: -1.4 MMOL/L (ref 0–2)
BASOPHILS # BLD AUTO: 0.05 10*3/MM3 (ref 0–0.2)
BASOPHILS NFR BLD AUTO: 0.7 % (ref 0–1.5)
BDY SITE: ABNORMAL
BILIRUB SERPL-MCNC: 0.4 MG/DL (ref 0–1.2)
BILIRUB UR QL STRIP: NEGATIVE
BODY TEMPERATURE: 37 C
BUN SERPL-MCNC: 15 MG/DL (ref 6–20)
BUN/CREAT SERPL: 22.7 (ref 7–25)
CALCIUM SPEC-SCNC: 9.1 MG/DL (ref 8.6–10.5)
CHLORIDE SERPL-SCNC: 93 MMOL/L (ref 98–107)
CK SERPL-CCNC: 46 U/L (ref 20–180)
CLARITY UR: CLEAR
CO2 SERPL-SCNC: 24 MMOL/L (ref 22–29)
COLOR UR: YELLOW
CREAT SERPL-MCNC: 0.66 MG/DL (ref 0.57–1)
DEPRECATED RDW RBC AUTO: 37.9 FL (ref 37–54)
EOSINOPHIL # BLD AUTO: 0.17 10*3/MM3 (ref 0–0.4)
EOSINOPHIL NFR BLD AUTO: 2.4 % (ref 0.3–6.2)
ERYTHROCYTE [DISTWIDTH] IN BLOOD BY AUTOMATED COUNT: 11.7 % (ref 12.3–15.4)
GFR SERPL CREATININE-BSD FRML MDRD: 98 ML/MIN/1.73
GLOBULIN UR ELPH-MCNC: 2.2 GM/DL
GLUCOSE BLDC GLUCOMTR-MCNC: 368 MG/DL (ref 70–130)
GLUCOSE BLDC GLUCOMTR-MCNC: 376 MG/DL (ref 70–130)
GLUCOSE BLDC GLUCOMTR-MCNC: 503 MG/DL (ref 70–130)
GLUCOSE BLDC GLUCOMTR-MCNC: 522 MG/DL (ref 70–130)
GLUCOSE SERPL-MCNC: 608 MG/DL (ref 65–99)
GLUCOSE UR STRIP-MCNC: ABNORMAL MG/DL
HCO3 BLDV-SCNC: 25.4 MMOL/L (ref 22–28)
HCT VFR BLD AUTO: 37.7 % (ref 34–46.6)
HGB BLD-MCNC: 13.1 G/DL (ref 12–15.9)
HGB UR QL STRIP.AUTO: NEGATIVE
IMM GRANULOCYTES # BLD AUTO: 0.01 10*3/MM3 (ref 0–0.05)
IMM GRANULOCYTES NFR BLD AUTO: 0.1 % (ref 0–0.5)
INHALED O2 CONCENTRATION: 21 %
KETONES UR QL STRIP: ABNORMAL
LEUKOCYTE ESTERASE UR QL STRIP.AUTO: NEGATIVE
LIPASE SERPL-CCNC: 53 U/L (ref 13–60)
LYMPHOCYTES # BLD AUTO: 2.49 10*3/MM3 (ref 0.7–3.1)
LYMPHOCYTES NFR BLD AUTO: 34.8 % (ref 19.6–45.3)
Lab: ABNORMAL
MAGNESIUM SERPL-MCNC: 2.1 MG/DL (ref 1.6–2.6)
MCH RBC QN AUTO: 30.8 PG (ref 26.6–33)
MCHC RBC AUTO-ENTMCNC: 34.7 G/DL (ref 31.5–35.7)
MCV RBC AUTO: 88.5 FL (ref 79–97)
MODALITY: ABNORMAL
MONOCYTES # BLD AUTO: 0.55 10*3/MM3 (ref 0.1–0.9)
MONOCYTES NFR BLD AUTO: 7.7 % (ref 5–12)
NEUTROPHILS NFR BLD AUTO: 3.89 10*3/MM3 (ref 1.7–7)
NEUTROPHILS NFR BLD AUTO: 54.3 % (ref 42.7–76)
NITRITE UR QL STRIP: NEGATIVE
NRBC BLD AUTO-RTO: 0 /100 WBC (ref 0–0.2)
OSMOLALITY SERPL: 316 MOSM/KG (ref 275–295)
PCO2 BLDV: 50.7 MM HG (ref 41–51)
PH BLDV: 7.31 PH UNITS (ref 7.32–7.42)
PH UR STRIP.AUTO: <=5 [PH] (ref 5–8)
PHOSPHATE SERPL-MCNC: 4.2 MG/DL (ref 2.5–4.5)
PLATELET # BLD AUTO: 233 10*3/MM3 (ref 140–450)
PMV BLD AUTO: 10 FL (ref 6–12)
PO2 BLDV: 30.8 MM HG (ref 27–53)
POTASSIUM SERPL-SCNC: 4.7 MMOL/L (ref 3.5–5.2)
PROT SERPL-MCNC: 6.4 G/DL (ref 6–8.5)
PROT UR QL STRIP: NEGATIVE
RBC # BLD AUTO: 4.26 10*6/MM3 (ref 3.77–5.28)
SAO2 % BLDCOV: 60.3 % (ref 45–75)
SODIUM SERPL-SCNC: 132 MMOL/L (ref 136–145)
SP GR UR STRIP: >1.03 (ref 1–1.03)
UROBILINOGEN UR QL STRIP: ABNORMAL
VENTILATOR MODE: ABNORMAL
WBC # BLD AUTO: 7.16 10*3/MM3 (ref 3.4–10.8)

## 2021-05-12 PROCEDURE — 84100 ASSAY OF PHOSPHORUS: CPT | Performed by: EMERGENCY MEDICINE

## 2021-05-12 PROCEDURE — 96375 TX/PRO/DX INJ NEW DRUG ADDON: CPT

## 2021-05-12 PROCEDURE — 25010000002 ONDANSETRON PER 1 MG: Performed by: EMERGENCY MEDICINE

## 2021-05-12 PROCEDURE — 63710000001 INSULIN REGULAR HUMAN PER 5 UNITS: Performed by: EMERGENCY MEDICINE

## 2021-05-12 PROCEDURE — 85025 COMPLETE CBC W/AUTO DIFF WBC: CPT | Performed by: EMERGENCY MEDICINE

## 2021-05-12 PROCEDURE — 82803 BLOOD GASES ANY COMBINATION: CPT

## 2021-05-12 PROCEDURE — 82550 ASSAY OF CK (CPK): CPT | Performed by: EMERGENCY MEDICINE

## 2021-05-12 PROCEDURE — 71045 X-RAY EXAM CHEST 1 VIEW: CPT

## 2021-05-12 PROCEDURE — 96366 THER/PROPH/DIAG IV INF ADDON: CPT

## 2021-05-12 PROCEDURE — 83735 ASSAY OF MAGNESIUM: CPT | Performed by: EMERGENCY MEDICINE

## 2021-05-12 PROCEDURE — 82009 KETONE BODYS QUAL: CPT | Performed by: EMERGENCY MEDICINE

## 2021-05-12 PROCEDURE — 83930 ASSAY OF BLOOD OSMOLALITY: CPT | Performed by: EMERGENCY MEDICINE

## 2021-05-12 PROCEDURE — 83690 ASSAY OF LIPASE: CPT | Performed by: EMERGENCY MEDICINE

## 2021-05-12 PROCEDURE — 82962 GLUCOSE BLOOD TEST: CPT

## 2021-05-12 PROCEDURE — 80053 COMPREHEN METABOLIC PANEL: CPT | Performed by: EMERGENCY MEDICINE

## 2021-05-12 PROCEDURE — 81003 URINALYSIS AUTO W/O SCOPE: CPT | Performed by: EMERGENCY MEDICINE

## 2021-05-12 PROCEDURE — 25010000002 LORAZEPAM PER 2 MG: Performed by: EMERGENCY MEDICINE

## 2021-05-12 PROCEDURE — 99285 EMERGENCY DEPT VISIT HI MDM: CPT

## 2021-05-12 PROCEDURE — 96365 THER/PROPH/DIAG IV INF INIT: CPT

## 2021-05-12 PROCEDURE — 25010000002 KETOROLAC TROMETHAMINE PER 15 MG: Performed by: EMERGENCY MEDICINE

## 2021-05-12 RX ORDER — ONDANSETRON HCL IN 0.9 % NACL 8 MG/50 ML
8 INTRAVENOUS SOLUTION, PIGGYBACK (ML) INTRAVENOUS ONCE
Status: COMPLETED | OUTPATIENT
Start: 2021-05-12 | End: 2021-05-12

## 2021-05-12 RX ORDER — LORAZEPAM 2 MG/ML
1 INJECTION INTRAMUSCULAR ONCE
Status: COMPLETED | OUTPATIENT
Start: 2021-05-12 | End: 2021-05-12

## 2021-05-12 RX ORDER — KETOROLAC TROMETHAMINE 15 MG/ML
15 INJECTION, SOLUTION INTRAMUSCULAR; INTRAVENOUS ONCE
Status: COMPLETED | OUTPATIENT
Start: 2021-05-12 | End: 2021-05-12

## 2021-05-12 RX ORDER — GABAPENTIN 100 MG/1
200 CAPSULE ORAL 3 TIMES DAILY
COMMUNITY

## 2021-05-12 RX ADMIN — ONDANSETRON 8 MG: 2 INJECTION INTRAMUSCULAR; INTRAVENOUS at 20:57

## 2021-05-12 RX ADMIN — SODIUM CHLORIDE 2000 ML: 9 INJECTION, SOLUTION INTRAVENOUS at 20:57

## 2021-05-12 RX ADMIN — LORAZEPAM 1 MG: 2 INJECTION INTRAMUSCULAR; INTRAVENOUS at 20:56

## 2021-05-12 RX ADMIN — INSULIN HUMAN 15 UNITS: 100 INJECTION, SOLUTION PARENTERAL at 21:41

## 2021-05-12 RX ADMIN — KETOROLAC TROMETHAMINE 15 MG: 15 INJECTION, SOLUTION INTRAMUSCULAR; INTRAVENOUS at 20:57

## 2021-05-13 VITALS
WEIGHT: 100 LBS | HEART RATE: 96 BPM | TEMPERATURE: 98.7 F | OXYGEN SATURATION: 96 % | BODY MASS INDEX: 16.66 KG/M2 | HEIGHT: 65 IN | SYSTOLIC BLOOD PRESSURE: 102 MMHG | RESPIRATION RATE: 16 BRPM | DIASTOLIC BLOOD PRESSURE: 72 MMHG

## 2021-09-08 ENCOUNTER — LAB (OUTPATIENT)
Dept: LAB | Facility: HOSPITAL | Age: 41
End: 2021-09-08

## 2021-09-08 ENCOUNTER — TRANSCRIBE ORDERS (OUTPATIENT)
Dept: ADMINISTRATIVE | Facility: HOSPITAL | Age: 41
End: 2021-09-08

## 2021-09-08 DIAGNOSIS — Z20.822 COVID-19 RULED OUT: Primary | ICD-10-CM

## 2021-09-08 PROCEDURE — U0004 COV-19 TEST NON-CDC HGH THRU: HCPCS | Performed by: FAMILY MEDICINE

## 2021-09-08 PROCEDURE — C9803 HOPD COVID-19 SPEC COLLECT: HCPCS | Performed by: FAMILY MEDICINE

## 2021-09-08 PROCEDURE — U0005 INFEC AGEN DETEC AMPLI PROBE: HCPCS | Performed by: FAMILY MEDICINE

## 2021-09-09 LAB — SARS-COV-2 ORF1AB RESP QL NAA+PROBE: NOT DETECTED

## 2021-10-11 ENCOUNTER — APPOINTMENT (OUTPATIENT)
Dept: GENERAL RADIOLOGY | Facility: HOSPITAL | Age: 41
End: 2021-10-11

## 2021-10-11 ENCOUNTER — APPOINTMENT (OUTPATIENT)
Dept: CT IMAGING | Facility: HOSPITAL | Age: 41
End: 2021-10-11

## 2021-10-11 ENCOUNTER — HOSPITAL ENCOUNTER (EMERGENCY)
Facility: HOSPITAL | Age: 41
Discharge: LEFT BEFORE DISCHARGE | End: 2021-10-11
Admitting: EMERGENCY MEDICINE

## 2021-10-11 VITALS
SYSTOLIC BLOOD PRESSURE: 128 MMHG | RESPIRATION RATE: 14 BRPM | WEIGHT: 98 LBS | HEIGHT: 66 IN | TEMPERATURE: 98.7 F | OXYGEN SATURATION: 100 % | DIASTOLIC BLOOD PRESSURE: 66 MMHG | HEART RATE: 107 BPM | BODY MASS INDEX: 15.75 KG/M2

## 2021-10-11 DIAGNOSIS — Z53.29 LEFT AGAINST MEDICAL ADVICE: ICD-10-CM

## 2021-10-11 DIAGNOSIS — R73.9 HYPERGLYCEMIA: ICD-10-CM

## 2021-10-11 DIAGNOSIS — R10.84 GENERALIZED ABDOMINAL PAIN: Primary | ICD-10-CM

## 2021-10-11 LAB
ACETONE BLD QL: ABNORMAL
ALBUMIN SERPL-MCNC: 4.3 G/DL (ref 3.5–5.2)
ALBUMIN/GLOB SERPL: 2 G/DL
ALP SERPL-CCNC: 61 U/L (ref 39–117)
ALT SERPL W P-5'-P-CCNC: 26 U/L (ref 1–33)
ANION GAP SERPL CALCULATED.3IONS-SCNC: 11 MMOL/L (ref 5–15)
AST SERPL-CCNC: 19 U/L (ref 1–32)
BASOPHILS # BLD AUTO: 0.05 10*3/MM3 (ref 0–0.2)
BASOPHILS NFR BLD AUTO: 0.9 % (ref 0–1.5)
BILIRUB SERPL-MCNC: 0.4 MG/DL (ref 0–1.2)
BILIRUB UR QL STRIP: NEGATIVE
BUN SERPL-MCNC: 14 MG/DL (ref 6–20)
BUN/CREAT SERPL: 26.9 (ref 7–25)
CALCIUM SPEC-SCNC: 8.5 MG/DL (ref 8.6–10.5)
CHLORIDE SERPL-SCNC: 97 MMOL/L (ref 98–107)
CLARITY UR: CLEAR
CO2 SERPL-SCNC: 27 MMOL/L (ref 22–29)
COLOR UR: YELLOW
CREAT SERPL-MCNC: 0.52 MG/DL (ref 0.57–1)
DEPRECATED RDW RBC AUTO: 41.6 FL (ref 37–54)
EOSINOPHIL # BLD AUTO: 0.05 10*3/MM3 (ref 0–0.4)
EOSINOPHIL NFR BLD AUTO: 0.9 % (ref 0.3–6.2)
ERYTHROCYTE [DISTWIDTH] IN BLOOD BY AUTOMATED COUNT: 12.7 % (ref 12.3–15.4)
GFR SERPL CREATININE-BSD FRML MDRD: 129 ML/MIN/1.73
GLOBULIN UR ELPH-MCNC: 2.2 GM/DL
GLUCOSE BLDC GLUCOMTR-MCNC: 368 MG/DL (ref 70–130)
GLUCOSE BLDC GLUCOMTR-MCNC: 437 MG/DL (ref 70–130)
GLUCOSE SERPL-MCNC: 413 MG/DL (ref 65–99)
GLUCOSE UR STRIP-MCNC: ABNORMAL MG/DL
HCT VFR BLD AUTO: 42 % (ref 34–46.6)
HGB BLD-MCNC: 14.1 G/DL (ref 12–15.9)
HGB UR QL STRIP.AUTO: NEGATIVE
IMM GRANULOCYTES # BLD AUTO: 0.01 10*3/MM3 (ref 0–0.05)
IMM GRANULOCYTES NFR BLD AUTO: 0.2 % (ref 0–0.5)
KETONES UR QL STRIP: ABNORMAL
LEUKOCYTE ESTERASE UR QL STRIP.AUTO: NEGATIVE
LIPASE SERPL-CCNC: 22 U/L (ref 13–60)
LYMPHOCYTES # BLD AUTO: 1.39 10*3/MM3 (ref 0.7–3.1)
LYMPHOCYTES NFR BLD AUTO: 25.4 % (ref 19.6–45.3)
MCH RBC QN AUTO: 30 PG (ref 26.6–33)
MCHC RBC AUTO-ENTMCNC: 33.6 G/DL (ref 31.5–35.7)
MCV RBC AUTO: 89.4 FL (ref 79–97)
MONOCYTES # BLD AUTO: 0.37 10*3/MM3 (ref 0.1–0.9)
MONOCYTES NFR BLD AUTO: 6.8 % (ref 5–12)
NEUTROPHILS NFR BLD AUTO: 3.61 10*3/MM3 (ref 1.7–7)
NEUTROPHILS NFR BLD AUTO: 65.8 % (ref 42.7–76)
NITRITE UR QL STRIP: NEGATIVE
NRBC BLD AUTO-RTO: 0 /100 WBC (ref 0–0.2)
PH UR STRIP.AUTO: 6 [PH] (ref 5–8)
PLATELET # BLD AUTO: 242 10*3/MM3 (ref 140–450)
PMV BLD AUTO: 9.7 FL (ref 6–12)
POTASSIUM SERPL-SCNC: 4.6 MMOL/L (ref 3.5–5.2)
PROT SERPL-MCNC: 6.5 G/DL (ref 6–8.5)
PROT UR QL STRIP: NEGATIVE
RBC # BLD AUTO: 4.7 10*6/MM3 (ref 3.77–5.28)
SARS-COV-2 RNA PNL SPEC NAA+PROBE: NOT DETECTED
SODIUM SERPL-SCNC: 135 MMOL/L (ref 136–145)
SP GR UR STRIP: >1.03 (ref 1–1.03)
TROPONIN T SERPL-MCNC: <0.01 NG/ML (ref 0–0.03)
UROBILINOGEN UR QL STRIP: ABNORMAL
WBC # BLD AUTO: 5.48 10*3/MM3 (ref 3.4–10.8)

## 2021-10-11 PROCEDURE — 85025 COMPLETE CBC W/AUTO DIFF WBC: CPT | Performed by: NURSE PRACTITIONER

## 2021-10-11 PROCEDURE — 80053 COMPREHEN METABOLIC PANEL: CPT | Performed by: NURSE PRACTITIONER

## 2021-10-11 PROCEDURE — 71045 X-RAY EXAM CHEST 1 VIEW: CPT

## 2021-10-11 PROCEDURE — 93010 ELECTROCARDIOGRAM REPORT: CPT | Performed by: INTERNAL MEDICINE

## 2021-10-11 PROCEDURE — 83690 ASSAY OF LIPASE: CPT | Performed by: NURSE PRACTITIONER

## 2021-10-11 PROCEDURE — 84484 ASSAY OF TROPONIN QUANT: CPT | Performed by: NURSE PRACTITIONER

## 2021-10-11 PROCEDURE — 99282 EMERGENCY DEPT VISIT SF MDM: CPT

## 2021-10-11 PROCEDURE — 81003 URINALYSIS AUTO W/O SCOPE: CPT | Performed by: NURSE PRACTITIONER

## 2021-10-11 PROCEDURE — 36415 COLL VENOUS BLD VENIPUNCTURE: CPT

## 2021-10-11 PROCEDURE — 93005 ELECTROCARDIOGRAM TRACING: CPT | Performed by: NURSE PRACTITIONER

## 2021-10-11 PROCEDURE — 82962 GLUCOSE BLOOD TEST: CPT

## 2021-10-11 PROCEDURE — 82009 KETONE BODYS QUAL: CPT | Performed by: NURSE PRACTITIONER

## 2021-10-11 PROCEDURE — 87635 SARS-COV-2 COVID-19 AMP PRB: CPT | Performed by: NURSE PRACTITIONER

## 2021-10-11 RX ORDER — ONDANSETRON 2 MG/ML
4 INJECTION INTRAMUSCULAR; INTRAVENOUS ONCE
Status: DISCONTINUED | OUTPATIENT
Start: 2021-10-11 | End: 2021-10-11 | Stop reason: HOSPADM

## 2021-10-11 RX ORDER — MORPHINE SULFATE 2 MG/ML
2 INJECTION, SOLUTION INTRAMUSCULAR; INTRAVENOUS ONCE
Status: DISCONTINUED | OUTPATIENT
Start: 2021-10-11 | End: 2021-10-11 | Stop reason: HOSPADM

## 2021-10-11 NOTE — ED PROVIDER NOTES
Subjective   Patient is a 40-year-old female with past medical history significant for type 1 diabetes, colitis, anxiety, and fibromyalgia presenting to the emergency department today because her blood sugar was high.  She states her blood sugar has been high for the past week with a reading of 423 this morning and was too high for her monitor read at home last night.  She states she has had DKA previously.  She reports abdominal pain described as a bad ache throughout x1 week.  She reports nausea, weakness, fatigue, lightheadedness, urinary frequency, and urinary urgency.  She denies vomiting, diarrhea, syncope, headaches, fevers, and chills.  She states she did not take any of her medications this morning.  Due to abdominal pain and elevated blood sugar she came to the ER for evaluation and treatment.          Review of Systems   Constitutional: Positive for fatigue. Negative for chills and fever.   HENT: Negative.  Negative for congestion.    Eyes: Negative.    Respiratory: Negative.  Negative for cough and shortness of breath.    Cardiovascular: Negative.  Negative for chest pain.   Gastrointestinal: Positive for abdominal pain and nausea. Negative for diarrhea and vomiting.   Genitourinary: Positive for frequency and urgency.   Musculoskeletal: Negative.    Skin: Negative.    Neurological: Positive for weakness and light-headedness.   All other systems reviewed and are negative.      Past Medical History:   Diagnosis Date   • Allergic    • Anxiety    • Arthritis    • Cancer (HCC)    • Colitis    • Depression    • Diabetes mellitus type I (HCC)    • Fibromyalgia    • Gallbladder abscess        Allergies   Allergen Reactions   • Cefaclor Hives   • Cephalexin    • Penicillins    • Sulfa Antibiotics        Past Surgical History:   Procedure Laterality Date   • CHOLECYSTECTOMY     • GROIN ABSCESS INCISION AND DRAINAGE Right 8/17/2018    Procedure: INCISION AND DRAINAGE RIGHT INGUINAL ABSCESS WITH APPLICATION OF  WOUND VAC;  Surgeon: Balaji Ochoa MD;  Location:  [facility] OR;  Service: General   • HYSTERECTOMY         Family History   Problem Relation Age of Onset   • Cancer Mother    • Diabetes Mother    • Cancer Father    • Heart disease Father    • Lung disease Father        Social History     Socioeconomic History   • Marital status: Single   Tobacco Use   • Smoking status: Current Every Day Smoker     Packs/day: 1.00     Years: 20.00     Pack years: 20.00     Types: Cigarettes   • Smokeless tobacco: Never Used   Vaping Use   • Vaping Use: Never used   Substance and Sexual Activity   • Alcohol use: No   • Drug use: Never   • Sexual activity: Yes           Objective   Physical Exam  Vitals and nursing note reviewed.   Constitutional:       Appearance: She is well-developed.   HENT:      Head: Normocephalic and atraumatic.      Right Ear: External ear normal.      Left Ear: External ear normal.      Nose: Nose normal.      Mouth/Throat:      Mouth: Mucous membranes are moist.      Pharynx: Oropharynx is clear.   Eyes:      Extraocular Movements: Extraocular movements intact.      Conjunctiva/sclera: Conjunctivae normal.      Pupils: Pupils are equal, round, and reactive to light.   Cardiovascular:      Rate and Rhythm: Regular rhythm. Tachycardia present.      Heart sounds: Normal heart sounds.   Pulmonary:      Effort: Pulmonary effort is normal.      Breath sounds: Normal breath sounds.   Abdominal:      General: Bowel sounds are normal.      Palpations: Abdomen is soft.      Tenderness: There is abdominal tenderness.      Comments: Pain to palpation throughout the abdomen, no rebound tenderness identified, bowel sounds noted all 4 quadrants   Musculoskeletal:         General: Normal range of motion.      Cervical back: Normal range of motion and neck supple.   Skin:     General: Skin is warm and dry.      Capillary Refill: Capillary refill takes less than 2 seconds.   Neurological:      General: No focal deficit  present.      Mental Status: She is alert and oriented to person, place, and time.   Psychiatric:         Mood and Affect: Mood normal.         Behavior: Behavior normal.         Thought Content: Thought content normal.         Judgment: Judgment normal.         Procedures           ED Course we had ordered labs and CT scan to further evaluate.  Patient is requesting to leave, she will have to sign out AGAINST MEDICAL ADVICE as we have not completed a full work-up.  Discussed with patient reasoning behind full work-up and the fact that she may be in DKA.  Patient is well aware however wants to leave AGAINST MEDICAL ADVICE at this time.  All risks were discussed.                                           MDM  Number of Diagnoses or Management Options  Generalized abdominal pain: new and requires workup  Hyperglycemia: new and requires workup  Left against medical advice: new and requires workup     Amount and/or Complexity of Data Reviewed  Clinical lab tests: ordered  Tests in the radiology section of CPT®: ordered    Risk of Complications, Morbidity, and/or Mortality  Presenting problems: moderate  Diagnostic procedures: moderate  Management options: moderate    Patient Progress  Patient progress: other (comment)      Final diagnoses:   Generalized abdominal pain   Hyperglycemia   Left against medical advice       ED Disposition  ED Disposition     ED Disposition Condition Comment    AMA            No follow-up provider specified.       Medication List      No changes were made to your prescriptions during this visit.          Zoey Myers, APRN  10/11/21 1531

## 2021-10-12 LAB
QT INTERVAL: 402 MS
QTC INTERVAL: 505 MS

## 2022-01-04 ENCOUNTER — HOSPITAL ENCOUNTER (INPATIENT)
Facility: HOSPITAL | Age: 42
LOS: 1 days | Discharge: HOME | End: 2022-01-05
Attending: EMERGENCY MEDICINE | Admitting: FAMILY MEDICINE

## 2022-01-04 DIAGNOSIS — E10.10 DIABETIC KETOACIDOSIS WITHOUT COMA ASSOCIATED WITH TYPE 1 DIABETES MELLITUS: Primary | ICD-10-CM

## 2022-01-04 LAB
ALBUMIN SERPL-MCNC: 4 G/DL (ref 3.5–5.2)
ALBUMIN/GLOB SERPL: 1.7 G/DL
ALP SERPL-CCNC: 88 U/L (ref 39–117)
ALT SERPL W P-5'-P-CCNC: 27 U/L (ref 1–33)
ANION GAP SERPL CALCULATED.3IONS-SCNC: 13 MMOL/L (ref 5–15)
ANION GAP SERPL CALCULATED.3IONS-SCNC: 15 MMOL/L (ref 5–15)
ANION GAP SERPL CALCULATED.3IONS-SCNC: 23 MMOL/L (ref 5–15)
ANION GAP SERPL CALCULATED.3IONS-SCNC: 9 MMOL/L (ref 5–15)
ARTERIAL PATENCY WRIST A: ABNORMAL
AST SERPL-CCNC: 17 U/L (ref 1–32)
ATMOSPHERIC PRESS: 757 MMHG
BASE EXCESS BLDA CALC-SCNC: -9.9 MMOL/L (ref 0–2)
BASOPHILS # BLD AUTO: 0.07 10*3/MM3 (ref 0–0.2)
BASOPHILS NFR BLD AUTO: 0.9 % (ref 0–1.5)
BDY SITE: ABNORMAL
BILIRUB SERPL-MCNC: 0.8 MG/DL (ref 0–1.2)
BILIRUB UR QL STRIP: NEGATIVE
BODY TEMPERATURE: 37 C
BUN SERPL-MCNC: 13 MG/DL (ref 6–20)
BUN SERPL-MCNC: 13 MG/DL (ref 6–20)
BUN SERPL-MCNC: 17 MG/DL (ref 6–20)
BUN SERPL-MCNC: 18 MG/DL (ref 6–20)
BUN/CREAT SERPL: 16 (ref 7–25)
BUN/CREAT SERPL: 21.7 (ref 7–25)
BUN/CREAT SERPL: 22.5 (ref 7–25)
BUN/CREAT SERPL: 23 (ref 7–25)
CALCIUM SPEC-SCNC: 7.9 MG/DL (ref 8.6–10.5)
CALCIUM SPEC-SCNC: 8 MG/DL (ref 8.6–10.5)
CALCIUM SPEC-SCNC: 8.3 MG/DL (ref 8.6–10.5)
CALCIUM SPEC-SCNC: 9 MG/DL (ref 8.6–10.5)
CHLORIDE SERPL-SCNC: 105 MMOL/L (ref 98–107)
CHLORIDE SERPL-SCNC: 105 MMOL/L (ref 98–107)
CHLORIDE SERPL-SCNC: 107 MMOL/L (ref 98–107)
CHLORIDE SERPL-SCNC: 96 MMOL/L (ref 98–107)
CLARITY UR: CLEAR
CO2 SERPL-SCNC: 16 MMOL/L (ref 22–29)
CO2 SERPL-SCNC: 18 MMOL/L (ref 22–29)
CO2 SERPL-SCNC: 21 MMOL/L (ref 22–29)
CO2 SERPL-SCNC: 22 MMOL/L (ref 22–29)
COLOR UR: YELLOW
CREAT SERPL-MCNC: 0.6 MG/DL (ref 0.57–1)
CREAT SERPL-MCNC: 0.74 MG/DL (ref 0.57–1)
CREAT SERPL-MCNC: 0.8 MG/DL (ref 0.57–1)
CREAT SERPL-MCNC: 0.81 MG/DL (ref 0.57–1)
DEPRECATED RDW RBC AUTO: 36.7 FL (ref 37–54)
EOSINOPHIL # BLD AUTO: 0.04 10*3/MM3 (ref 0–0.4)
EOSINOPHIL NFR BLD AUTO: 0.5 % (ref 0.3–6.2)
ERYTHROCYTE [DISTWIDTH] IN BLOOD BY AUTOMATED COUNT: 11.4 % (ref 12.3–15.4)
GFR SERPL CREATININE-BSD FRML MDRD: 109 ML/MIN/1.73
GFR SERPL CREATININE-BSD FRML MDRD: 77 ML/MIN/1.73
GFR SERPL CREATININE-BSD FRML MDRD: 78 ML/MIN/1.73
GFR SERPL CREATININE-BSD FRML MDRD: 86 ML/MIN/1.73
GLOBULIN UR ELPH-MCNC: 2.4 GM/DL
GLUCOSE BLDC GLUCOMTR-MCNC: 125 MG/DL (ref 70–130)
GLUCOSE BLDC GLUCOMTR-MCNC: 138 MG/DL (ref 70–130)
GLUCOSE BLDC GLUCOMTR-MCNC: 157 MG/DL (ref 70–130)
GLUCOSE BLDC GLUCOMTR-MCNC: 185 MG/DL (ref 70–130)
GLUCOSE BLDC GLUCOMTR-MCNC: 243 MG/DL (ref 70–130)
GLUCOSE BLDC GLUCOMTR-MCNC: 264 MG/DL (ref 70–130)
GLUCOSE BLDC GLUCOMTR-MCNC: 268 MG/DL (ref 70–130)
GLUCOSE BLDC GLUCOMTR-MCNC: 353 MG/DL (ref 70–130)
GLUCOSE BLDC GLUCOMTR-MCNC: 408 MG/DL (ref 70–130)
GLUCOSE BLDC GLUCOMTR-MCNC: 440 MG/DL (ref 70–130)
GLUCOSE BLDC GLUCOMTR-MCNC: 84 MG/DL (ref 70–130)
GLUCOSE BLDC GLUCOMTR-MCNC: 89 MG/DL (ref 70–130)
GLUCOSE SERPL-MCNC: 147 MG/DL (ref 65–99)
GLUCOSE SERPL-MCNC: 240 MG/DL (ref 65–99)
GLUCOSE SERPL-MCNC: 267 MG/DL (ref 65–99)
GLUCOSE SERPL-MCNC: 431 MG/DL (ref 65–99)
GLUCOSE UR STRIP-MCNC: ABNORMAL MG/DL
HBA1C MFR BLD: 11.3 % (ref 4.8–5.6)
HCO3 BLDA-SCNC: 15.7 MMOL/L (ref 20–26)
HCT VFR BLD AUTO: 40.9 % (ref 34–46.6)
HGB BLD-MCNC: 14.4 G/DL (ref 12–15.9)
HGB UR QL STRIP.AUTO: NEGATIVE
HOLD SPECIMEN: NORMAL
IMM GRANULOCYTES # BLD AUTO: 0.03 10*3/MM3 (ref 0–0.05)
IMM GRANULOCYTES NFR BLD AUTO: 0.4 % (ref 0–0.5)
KETONES UR QL STRIP: ABNORMAL
LEUKOCYTE ESTERASE UR QL STRIP.AUTO: NEGATIVE
LIPASE SERPL-CCNC: 7 U/L (ref 13–60)
LYMPHOCYTES # BLD AUTO: 2.33 10*3/MM3 (ref 0.7–3.1)
LYMPHOCYTES NFR BLD AUTO: 31.2 % (ref 19.6–45.3)
Lab: ABNORMAL
MAGNESIUM SERPL-MCNC: 1.5 MG/DL (ref 1.6–2.6)
MAGNESIUM SERPL-MCNC: 1.5 MG/DL (ref 1.6–2.6)
MAGNESIUM SERPL-MCNC: 1.6 MG/DL (ref 1.6–2.6)
MAGNESIUM SERPL-MCNC: 1.8 MG/DL (ref 1.6–2.6)
MCH RBC QN AUTO: 30.6 PG (ref 26.6–33)
MCHC RBC AUTO-ENTMCNC: 35.2 G/DL (ref 31.5–35.7)
MCV RBC AUTO: 86.8 FL (ref 79–97)
MODALITY: ABNORMAL
MONOCYTES # BLD AUTO: 0.61 10*3/MM3 (ref 0.1–0.9)
MONOCYTES NFR BLD AUTO: 8.2 % (ref 5–12)
NEUTROPHILS NFR BLD AUTO: 4.38 10*3/MM3 (ref 1.7–7)
NEUTROPHILS NFR BLD AUTO: 58.8 % (ref 42.7–76)
NITRITE UR QL STRIP: NEGATIVE
NRBC BLD AUTO-RTO: 0 /100 WBC (ref 0–0.2)
OSMOLALITY SERPL: 308 MOSM/KG (ref 275–295)
PCO2 BLDA: 33.4 MM HG (ref 35–45)
PCO2 TEMP ADJ BLD: 33.4 MM HG (ref 35–45)
PH BLDA: 7.28 PH UNITS (ref 7.35–7.45)
PH UR STRIP.AUTO: <=5 [PH] (ref 5–8)
PH, TEMP CORRECTED: 7.28 PH UNITS (ref 7.35–7.45)
PHOSPHATE SERPL-MCNC: 3.2 MG/DL (ref 2.5–4.5)
PHOSPHATE SERPL-MCNC: 3.6 MG/DL (ref 2.5–4.5)
PHOSPHATE SERPL-MCNC: 3.9 MG/DL (ref 2.5–4.5)
PHOSPHATE SERPL-MCNC: 4.6 MG/DL (ref 2.5–4.5)
PLATELET # BLD AUTO: 285 10*3/MM3 (ref 140–450)
PMV BLD AUTO: 10.3 FL (ref 6–12)
PO2 BLDA: 89.5 MM HG (ref 83–108)
PO2 TEMP ADJ BLD: 89.5 MM HG (ref 83–108)
POTASSIUM SERPL-SCNC: 4.1 MMOL/L (ref 3.5–5.2)
POTASSIUM SERPL-SCNC: 4.2 MMOL/L (ref 3.5–5.2)
POTASSIUM SERPL-SCNC: 4.4 MMOL/L (ref 3.5–5.2)
POTASSIUM SERPL-SCNC: 4.5 MMOL/L (ref 3.5–5.2)
PROT SERPL-MCNC: 6.4 G/DL (ref 6–8.5)
PROT UR QL STRIP: NEGATIVE
RBC # BLD AUTO: 4.71 10*6/MM3 (ref 3.77–5.28)
SAO2 % BLDCOA: 97 % (ref 94–99)
SARS-COV-2 RNA PNL SPEC NAA+PROBE: NOT DETECTED
SODIUM SERPL-SCNC: 135 MMOL/L (ref 136–145)
SODIUM SERPL-SCNC: 138 MMOL/L (ref 136–145)
SODIUM SERPL-SCNC: 138 MMOL/L (ref 136–145)
SODIUM SERPL-SCNC: 139 MMOL/L (ref 136–145)
SP GR UR STRIP: 1.03 (ref 1–1.03)
UROBILINOGEN UR QL STRIP: ABNORMAL
VENTILATOR MODE: ABNORMAL
WBC NRBC COR # BLD: 7.46 10*3/MM3 (ref 3.4–10.8)
WHOLE BLOOD HOLD SPECIMEN: NORMAL
WHOLE BLOOD HOLD SPECIMEN: NORMAL

## 2022-01-04 PROCEDURE — 84100 ASSAY OF PHOSPHORUS: CPT | Performed by: EMERGENCY MEDICINE

## 2022-01-04 PROCEDURE — 80053 COMPREHEN METABOLIC PANEL: CPT | Performed by: EMERGENCY MEDICINE

## 2022-01-04 PROCEDURE — 0 INSULIN REGULAR HUMAN PER 5 UNITS: Performed by: EMERGENCY MEDICINE

## 2022-01-04 PROCEDURE — 36600 WITHDRAWAL OF ARTERIAL BLOOD: CPT

## 2022-01-04 PROCEDURE — 83735 ASSAY OF MAGNESIUM: CPT | Performed by: EMERGENCY MEDICINE

## 2022-01-04 PROCEDURE — 99285 EMERGENCY DEPT VISIT HI MDM: CPT

## 2022-01-04 PROCEDURE — 25010000002 ONDANSETRON PER 1 MG: Performed by: EMERGENCY MEDICINE

## 2022-01-04 PROCEDURE — 83690 ASSAY OF LIPASE: CPT | Performed by: EMERGENCY MEDICINE

## 2022-01-04 PROCEDURE — 82962 GLUCOSE BLOOD TEST: CPT

## 2022-01-04 PROCEDURE — 82803 BLOOD GASES ANY COMBINATION: CPT

## 2022-01-04 PROCEDURE — 83036 HEMOGLOBIN GLYCOSYLATED A1C: CPT | Performed by: EMERGENCY MEDICINE

## 2022-01-04 PROCEDURE — 93010 ELECTROCARDIOGRAM REPORT: CPT | Performed by: INTERNAL MEDICINE

## 2022-01-04 PROCEDURE — 93005 ELECTROCARDIOGRAM TRACING: CPT | Performed by: EMERGENCY MEDICINE

## 2022-01-04 PROCEDURE — 63710000001 INSULIN DETEMIR PER 5 UNITS: Performed by: INTERNAL MEDICINE

## 2022-01-04 PROCEDURE — 25010000002 MORPHINE SULFATE (PF) 2 MG/ML SOLUTION: Performed by: FAMILY MEDICINE

## 2022-01-04 PROCEDURE — 87635 SARS-COV-2 COVID-19 AMP PRB: CPT | Performed by: EMERGENCY MEDICINE

## 2022-01-04 PROCEDURE — 85025 COMPLETE CBC W/AUTO DIFF WBC: CPT | Performed by: EMERGENCY MEDICINE

## 2022-01-04 PROCEDURE — 83930 ASSAY OF BLOOD OSMOLALITY: CPT | Performed by: EMERGENCY MEDICINE

## 2022-01-04 PROCEDURE — 81003 URINALYSIS AUTO W/O SCOPE: CPT | Performed by: EMERGENCY MEDICINE

## 2022-01-04 RX ORDER — CLONAZEPAM 1 MG/1
1 TABLET ORAL EVERY 8 HOURS SCHEDULED
Status: DISCONTINUED | OUTPATIENT
Start: 2022-01-04 | End: 2022-01-05 | Stop reason: HOSPADM

## 2022-01-04 RX ORDER — ONDANSETRON 2 MG/ML
4 INJECTION INTRAMUSCULAR; INTRAVENOUS EVERY 6 HOURS PRN
Status: DISCONTINUED | OUTPATIENT
Start: 2022-01-04 | End: 2022-01-05 | Stop reason: HOSPADM

## 2022-01-04 RX ORDER — DEXTROSE MONOHYDRATE 25 G/50ML
12.5 INJECTION, SOLUTION INTRAVENOUS AS NEEDED
Status: DISCONTINUED | OUTPATIENT
Start: 2022-01-04 | End: 2022-01-05 | Stop reason: HOSPADM

## 2022-01-04 RX ORDER — SODIUM CHLORIDE 0.9 % (FLUSH) 0.9 %
10 SYRINGE (ML) INJECTION AS NEEDED
Status: DISCONTINUED | OUTPATIENT
Start: 2022-01-04 | End: 2022-01-04

## 2022-01-04 RX ORDER — ONDANSETRON 2 MG/ML
4 INJECTION INTRAMUSCULAR; INTRAVENOUS ONCE
Status: DISCONTINUED | OUTPATIENT
Start: 2022-01-04 | End: 2022-01-04

## 2022-01-04 RX ORDER — DEXTROSE, SODIUM CHLORIDE, AND POTASSIUM CHLORIDE 5; .45; .15 G/100ML; G/100ML; G/100ML
150 INJECTION INTRAVENOUS CONTINUOUS PRN
Status: DISCONTINUED | OUTPATIENT
Start: 2022-01-04 | End: 2022-01-04

## 2022-01-04 RX ORDER — DEXTROSE AND SODIUM CHLORIDE 5; .9 G/100ML; G/100ML
150 INJECTION, SOLUTION INTRAVENOUS CONTINUOUS PRN
Status: DISCONTINUED | OUTPATIENT
Start: 2022-01-04 | End: 2022-01-04

## 2022-01-04 RX ORDER — SODIUM CHLORIDE AND POTASSIUM CHLORIDE 150; 900 MG/100ML; MG/100ML
250 INJECTION, SOLUTION INTRAVENOUS CONTINUOUS PRN
Status: DISCONTINUED | OUTPATIENT
Start: 2022-01-04 | End: 2022-01-04

## 2022-01-04 RX ORDER — SODIUM CHLORIDE 450 MG/100ML
250 INJECTION, SOLUTION INTRAVENOUS CONTINUOUS PRN
Status: DISCONTINUED | OUTPATIENT
Start: 2022-01-04 | End: 2022-01-04

## 2022-01-04 RX ORDER — TRAMADOL HYDROCHLORIDE 50 MG/1
50 TABLET ORAL 2 TIMES DAILY PRN
Status: DISCONTINUED | OUTPATIENT
Start: 2022-01-04 | End: 2022-01-05 | Stop reason: HOSPADM

## 2022-01-04 RX ORDER — SODIUM CHLORIDE 0.9 % (FLUSH) 0.9 %
10 SYRINGE (ML) INJECTION ONCE AS NEEDED
Status: DISCONTINUED | OUTPATIENT
Start: 2022-01-04 | End: 2022-01-04

## 2022-01-04 RX ORDER — SODIUM CHLORIDE AND POTASSIUM CHLORIDE 300; 900 MG/100ML; MG/100ML
250 INJECTION, SOLUTION INTRAVENOUS CONTINUOUS PRN
Status: DISCONTINUED | OUTPATIENT
Start: 2022-01-04 | End: 2022-01-04

## 2022-01-04 RX ORDER — ONDANSETRON 2 MG/ML
4 INJECTION INTRAMUSCULAR; INTRAVENOUS ONCE
Status: COMPLETED | OUTPATIENT
Start: 2022-01-04 | End: 2022-01-04

## 2022-01-04 RX ORDER — SODIUM CHLORIDE 9 MG/ML
250 INJECTION, SOLUTION INTRAVENOUS CONTINUOUS PRN
Status: DISCONTINUED | OUTPATIENT
Start: 2022-01-04 | End: 2022-01-04

## 2022-01-04 RX ORDER — MORPHINE SULFATE 2 MG/ML
1 INJECTION, SOLUTION INTRAMUSCULAR; INTRAVENOUS EVERY 4 HOURS PRN
Status: DISCONTINUED | OUTPATIENT
Start: 2022-01-04 | End: 2022-01-04

## 2022-01-04 RX ORDER — POTASSIUM CHLORIDE, DEXTROSE MONOHYDRATE AND SODIUM CHLORIDE 300; 5; 900 MG/100ML; G/100ML; MG/100ML
150 INJECTION, SOLUTION INTRAVENOUS CONTINUOUS PRN
Status: DISCONTINUED | OUTPATIENT
Start: 2022-01-04 | End: 2022-01-04

## 2022-01-04 RX ORDER — SODIUM CHLORIDE 9 MG/ML
10 INJECTION, SOLUTION INTRAVENOUS CONTINUOUS PRN
Status: DISCONTINUED | OUTPATIENT
Start: 2022-01-04 | End: 2022-01-04

## 2022-01-04 RX ORDER — DEXTROSE AND SODIUM CHLORIDE 5; .45 G/100ML; G/100ML
150 INJECTION, SOLUTION INTRAVENOUS CONTINUOUS PRN
Status: DISCONTINUED | OUTPATIENT
Start: 2022-01-04 | End: 2022-01-04

## 2022-01-04 RX ORDER — SODIUM CHLORIDE 0.9 % (FLUSH) 0.9 %
3 SYRINGE (ML) INJECTION EVERY 12 HOURS SCHEDULED
Status: DISCONTINUED | OUTPATIENT
Start: 2022-01-04 | End: 2022-01-05 | Stop reason: HOSPADM

## 2022-01-04 RX ORDER — SODIUM CHLORIDE 0.9 % (FLUSH) 0.9 %
10 SYRINGE (ML) INJECTION AS NEEDED
Status: DISCONTINUED | OUTPATIENT
Start: 2022-01-04 | End: 2022-01-05 | Stop reason: HOSPADM

## 2022-01-04 RX ORDER — GABAPENTIN 100 MG/1
200 CAPSULE ORAL EVERY 8 HOURS SCHEDULED
Status: DISCONTINUED | OUTPATIENT
Start: 2022-01-04 | End: 2022-01-05 | Stop reason: HOSPADM

## 2022-01-04 RX ORDER — DEXTROSE, SODIUM CHLORIDE, AND POTASSIUM CHLORIDE 5; .9; .15 G/100ML; G/100ML; G/100ML
150 INJECTION INTRAVENOUS CONTINUOUS PRN
Status: DISCONTINUED | OUTPATIENT
Start: 2022-01-04 | End: 2022-01-04

## 2022-01-04 RX ORDER — DEXTROSE, SODIUM CHLORIDE, AND POTASSIUM CHLORIDE 5; .45; .3 G/100ML; G/100ML; G/100ML
150 INJECTION INTRAVENOUS CONTINUOUS PRN
Status: DISCONTINUED | OUTPATIENT
Start: 2022-01-04 | End: 2022-01-04

## 2022-01-04 RX ORDER — SODIUM CHLORIDE AND POTASSIUM CHLORIDE 150; 450 MG/100ML; MG/100ML
250 INJECTION, SOLUTION INTRAVENOUS CONTINUOUS PRN
Status: DISCONTINUED | OUTPATIENT
Start: 2022-01-04 | End: 2022-01-04

## 2022-01-04 RX ORDER — DEXTROSE MONOHYDRATE 25 G/50ML
25 INJECTION, SOLUTION INTRAVENOUS
Status: DISCONTINUED | OUTPATIENT
Start: 2022-01-04 | End: 2022-01-05 | Stop reason: HOSPADM

## 2022-01-04 RX ORDER — NICOTINE POLACRILEX 4 MG
15 LOZENGE BUCCAL
Status: DISCONTINUED | OUTPATIENT
Start: 2022-01-04 | End: 2022-01-05 | Stop reason: HOSPADM

## 2022-01-04 RX ADMIN — ONDANSETRON 4 MG: 2 INJECTION INTRAMUSCULAR; INTRAVENOUS at 08:48

## 2022-01-04 RX ADMIN — SODIUM CHLORIDE, PRESERVATIVE FREE 3 ML: 5 INJECTION INTRAVENOUS at 10:00

## 2022-01-04 RX ADMIN — SODIUM CHLORIDE, PRESERVATIVE FREE 3 ML: 5 INJECTION INTRAVENOUS at 23:08

## 2022-01-04 RX ADMIN — SODIUM CHLORIDE 1000 ML: 9 INJECTION, SOLUTION INTRAVENOUS at 09:58

## 2022-01-04 RX ADMIN — MORPHINE SULFATE 1 MG: 2 INJECTION, SOLUTION INTRAMUSCULAR; INTRAVENOUS at 12:59

## 2022-01-04 RX ADMIN — TRAMADOL HYDROCHLORIDE 50 MG: 50 TABLET, COATED ORAL at 22:03

## 2022-01-04 RX ADMIN — SODIUM CHLORIDE 1000 ML: 9 INJECTION, SOLUTION INTRAVENOUS at 08:42

## 2022-01-04 RX ADMIN — POTASSIUM CHLORIDE, DEXTROSE MONOHYDRATE AND SODIUM CHLORIDE 150 ML/HR: 150; 5; 450 INJECTION, SOLUTION INTRAVENOUS at 14:25

## 2022-01-04 RX ADMIN — INSULIN DETEMIR 24 UNITS: 100 INJECTION, SOLUTION SUBCUTANEOUS at 20:29

## 2022-01-04 RX ADMIN — SODIUM CHLORIDE 5 UNITS/HR: 9 INJECTION, SOLUTION INTRAVENOUS at 10:16

## 2022-01-04 RX ADMIN — MORPHINE SULFATE 1 MG: 2 INJECTION, SOLUTION INTRAMUSCULAR; INTRAVENOUS at 17:51

## 2022-01-04 RX ADMIN — GABAPENTIN 200 MG: 100 CAPSULE ORAL at 22:03

## 2022-01-04 RX ADMIN — DEXTROSE MONOHYDRATE 12.5 G: 500 INJECTION PARENTERAL at 14:20

## 2022-01-04 RX ADMIN — CLONAZEPAM 1 MG: 1 TABLET ORAL at 22:03

## 2022-01-04 NOTE — CONSULTS
"Pt awake and alert.  Last A1C on chart is 14.3% on 4/5/21. Pt states she wears a Dexcom 6 and her blood sugars have been running 200's.  She tells me she \"accidentally\" threw her sensor away and her meter is broken.  She will get another sensor later this month. Given sample glucometer  for home use to use until new sensor arrives.  She said she has spoken to her PCP and she \"feels better\" when she is running around 200 on her blood sugars and they agreed to that goal.  Pt can not remember her last hypoglycemia episode.  Pt states she takes long acting but does not call PCP if fasting is running too high.  Pt states she uses a sliding scale and meal insulin but never does a post-prandial to see if in range. Pt tells me she has a log at home of what she eats and so on and takes it to PCP visits.  Pt an not remember her last eye exam. Discussed better control for chance to prevent complications.  Pt did state she wanted to feel better.  Time left for questions.  "

## 2022-01-04 NOTE — ED PROVIDER NOTES
Subjective   Patient is a 41-year-old female who presents to the ER with hyperglycemia.  Patient has a history of insulin-dependent diabetes and states her glucose has been elevated for the last 2 days.  EMS states the glucose was 450 this morning.  Patient has had no insulin today.  She feels like she is in DKA.  Patient has associated nausea and generalized weakness.  She denies any fever, chest pain, shortness of air, abdominal pain, vomiting, diarrhea, urinary changes, neurologic changes.          Review of Systems   HENT: Negative.    Eyes: Negative.    Respiratory: Negative.    Cardiovascular: Negative.    Gastrointestinal: Positive for nausea.   Endocrine:        Hyperglycemia   Genitourinary: Negative.    Musculoskeletal: Negative.    Skin: Negative.    Allergic/Immunologic: Negative.    Neurological: Positive for weakness.   Hematological: Negative.    Psychiatric/Behavioral: Negative.    All other systems reviewed and are negative.      Past Medical History:   Diagnosis Date   • Allergic    • Anxiety    • Arthritis    • Cancer (HCC)    • Colitis    • Depression    • Diabetes mellitus type I (HCC)    • Fibromyalgia    • Gallbladder abscess        Allergies   Allergen Reactions   • Cefaclor Hives   • Cephalexin    • Penicillins    • Sulfa Antibiotics        Past Surgical History:   Procedure Laterality Date   • CHOLECYSTECTOMY     • GROIN ABSCESS INCISION AND DRAINAGE Right 8/17/2018    Procedure: INCISION AND DRAINAGE RIGHT INGUINAL ABSCESS WITH APPLICATION OF WOUND VAC;  Surgeon: Balaji Ochoa MD;  Location: [facility] OR;  Service: General   • HYSTERECTOMY         Family History   Problem Relation Age of Onset   • Cancer Mother    • Diabetes Mother    • Cancer Father    • Heart disease Father    • Lung disease Father        Social History     Socioeconomic History   • Marital status: Single   Tobacco Use   • Smoking status: Current Every Day Smoker     Packs/day: 1.00     Years: 20.00     Pack years:  20.00     Types: Cigarettes   • Smokeless tobacco: Never Used   Vaping Use   • Vaping Use: Never used   Substance and Sexual Activity   • Alcohol use: No   • Drug use: Never   • Sexual activity: Yes           Objective   Physical Exam  Vitals and nursing note reviewed.   Constitutional:       Appearance: She is well-developed.   HENT:      Head: Normocephalic and atraumatic.   Eyes:      Conjunctiva/sclera: Conjunctivae normal.      Pupils: Pupils are equal, round, and reactive to light.   Cardiovascular:      Rate and Rhythm: Regular rhythm. Tachycardia present.      Heart sounds: Normal heart sounds.   Pulmonary:      Effort: Pulmonary effort is normal.      Breath sounds: Normal breath sounds.   Abdominal:      Palpations: Abdomen is soft.      Tenderness: There is no abdominal tenderness.   Musculoskeletal:         General: No deformity. Normal range of motion.      Cervical back: Normal range of motion.   Skin:     General: Skin is warm.   Neurological:      Mental Status: She is alert and oriented to person, place, and time.   Psychiatric:         Behavior: Behavior normal.         Procedures           ED Course      ECG: Sinus tachycardia with a rate of 111, prolonged QT, no acute ischemia or infarction    Patient was given IVFs and zofran.       Lab Results (last 24 hours)     Procedure Component Value Units Date/Time    POC Glucose Once [[id number]]  (Abnormal) Collected: 01/04/22 0821    Specimen: Blood Updated: 01/04/22 0835     Glucose 440 mg/dL      Comment: : [id number] [employer] ID: [id number]       POC Glucose Once [[id number]]  (Abnormal) Collected: 01/04/22 0824    Specimen: Blood Updated: 01/04/22 0836     Glucose 408 mg/dL      Comment: : [id number] [employer]y PamelaMeter ID: [id number]       Blood Gas, Arterial - [[id number]]  (Abnormal) Collected: 01/04/22 0835    Specimen: Arterial Blood Updated: 01/04/22 0842     Site Right Brachial     Joel's Test N/A     pH, Arterial 7.281 pH  units      Comment: 84 Value below reference range        pCO2, Arterial 33.4 mm Hg      Comment: 84 Value below reference range        pO2, Arterial 89.5 mm Hg      HCO3, Arterial 15.7 mmol/L      Comment: 84 Value below reference range        Base Excess, Arterial -9.9 mmol/L      Comment: 84 Value below reference range        O2 Saturation, Arterial 97.0 %      Temperature 37.0 C      Barometric Pressure for Blood Gas 757 mmHg      Modality Room Air     Ventilator Mode NA     Collected by [id number]     Comment: Meter: [id number]     :  [id number]        pCO2, Temperature Corrected 33.4 mm Hg      pH, Temp Corrected 7.281 pH Units      pO2, Temperature Corrected 89.5 mm Hg     CBC & Differential [[id number]]  (Abnormal) Collected: 01/04/22 0840    Specimen: Blood from Hand, Left Updated: 01/04/22 0903    Narrative:      The following orders were created for panel order CBC & Differential.  Procedure                               Abnormality         Status                     ---------                               -----------         ------                     CBC Auto Differential[[id number]]        Abnormal            Final result                 Please view results for these tests on the individual orders.    Comprehensive Metabolic Panel [[id number]]  (Abnormal) Collected: 01/04/22 0840    Specimen: Blood from Hand, Left Updated: 01/04/22 0934     Glucose 431 mg/dL      BUN 18 mg/dL      Creatinine 0.80 mg/dL      Sodium 135 mmol/L      Potassium 4.5 mmol/L      Comment: Slight hemolysis detected by analyzer. Results may be affected.        Chloride 96 mmol/L      CO2 16.0 mmol/L      Calcium 9.0 mg/dL      Total Protein 6.4 g/dL      Albumin 4.00 g/dL      ALT (SGPT) 27 U/L      AST (SGOT) 17 U/L      Alkaline Phosphatase 88 U/L      Total Bilirubin 0.8 mg/dL      eGFR Non African Amer 78 mL/min/1.73      Globulin 2.4 gm/dL      A/G Ratio 1.7 g/dL      BUN/Creatinine Ratio 22.5     Anion Gap 23.0 mmol/L      Narrative:      GFR Normal >60  Chronic Kidney Disease <60  Kidney Failure <15      Lipase [[id number]]  (Abnormal) Collected: 01/04/22 0840    Specimen: Blood from Hand, Left Updated: 01/04/22 0916     Lipase 7 U/L     CBC Auto Differential [[id number]]  (Abnormal) Collected: 01/04/22 0840    Specimen: Blood from Hand, Left Updated: 01/04/22 0903     WBC 7.46 10*3/mm3      RBC 4.71 10*6/mm3      Hemoglobin 14.4 g/dL      Hematocrit 40.9 %      MCV 86.8 fL      MCH 30.6 pg      MCHC 35.2 g/dL      RDW 11.4 %      RDW-SD 36.7 fl      MPV 10.3 fL      Platelets 285 10*3/mm3      Neutrophil % 58.8 %      Lymphocyte % 31.2 %      Monocyte % 8.2 %      Eosinophil % 0.5 %      Basophil % 0.9 %      Immature Grans % 0.4 %      Neutrophils, Absolute 4.38 10*3/mm3      Lymphocytes, Absolute 2.33 10*3/mm3      Monocytes, Absolute 0.61 10*3/mm3      Eosinophils, Absolute 0.04 10*3/mm3      Basophils, Absolute 0.07 10*3/mm3      Immature Grans, Absolute 0.03 10*3/mm3      nRBC 0.0 /100 WBC     Hemoglobin A1c [[id number]] Collected: 01/04/22 0840    Specimen: Blood from Hand, Left Updated: 01/04/22 0956    COVID-19,[employer] IN-HOUSE,Nasal Swab No Transport Media 3-4 HR TAT - Swab, Nasal Cavity [[id number]]  (Normal) Collected: 01/04/22 0854    Specimen: Swab from Nasal Cavity Updated: 01/04/22 1001     COVID19 Not Detected    Narrative:      Fact sheet for providers: https://www.fda.gov/media/543063/download     Fact sheet for patients: https://www.fda.gov/media/563340/download    Test performed by PCR.    Consider negative results in combination with clinical observations, patient history, and epidemiological information.    Urinalysis With Culture If Indicated - Urine, Clean Catch [[id number]]  (Abnormal) Collected: 01/04/22 0911    Specimen: Urine, Clean Catch Updated: 01/04/22 0926     Color, UA Yellow     Appearance, UA Clear     pH, UA <=5.0     Specific Gravity, UA 1.029     Glucose, UA >=1000 mg/dL (3+)     Ketones, UA  >=160 mg/dL (4+)     Bilirubin, UA Negative     Blood, UA Negative     Protein, UA Negative     Leuk Esterase, UA Negative     Nitrite, UA Negative     Urobilinogen, UA 0.2 E.U./dL    Narrative:      Urine microscopic not indicated.      \  Labs were consistent with DKA.  Labs showed hyperglycemia, metabolic acidosis,  elevated anion gap, ketonuria, and a decreased bicarb.  Patient was started on insulin drip.  Patient was then admitted to Dr. Mendoza's service for further treatment.                                      MDM    Final diagnoses:   Diabetic ketoacidosis without coma associated with type 1 diabetes mellitus (HCC)       ED Disposition  ED Disposition     ED Disposition Condition Comment    Decision to Admit  Level of Care: Critical Care [6]   Diagnosis: Diabetic ketoacidosis without coma associated with type 1 diabetes mellitus (HCC) [2539542]   Admitting Physician: LUZ MENDOZA [[id number]]   Attending Physician: LUZ MENDOZA [[id number]]   Certification: I Certify That Inpatient Hospital Services Are Medically Necessary For Greater Than 2 Midnights            No follow-up provider specified.       Medication List      No changes were made to your prescriptions during this visit.          Cynthia Humphreys MD  01/04/22 9126

## 2022-01-04 NOTE — H&P
" History and Physical      CHIEF COMPLAINT:  N/V, Hyperglycemia    Reason for Admission:  DKA    History Obtained From:  Patient, chart    HISTORY OF PRESENT ILLNESS:      The patient is a 41 y.o. female who came to ER with 2 day h/o elevated glucose, N/V, poor PO intake. No CP or SOA. No fever. She has no dysuria. Her DM is poorly controlled.   Past Medical History:    Past Medical History:   Diagnosis Date   • Allergic    • Anxiety    • Arthritis    • Cancer (HCC)    • Colitis    • Depression    • Diabetes mellitus type I (HCC)    • Fibromyalgia    • Gallbladder abscess      Past Surgical History:    Past Surgical History:   Procedure Laterality Date   • CHOLECYSTECTOMY     • GROIN ABSCESS INCISION AND DRAINAGE Right 8/17/2018    Procedure: INCISION AND DRAINAGE RIGHT INGUINAL ABSCESS WITH APPLICATION OF WOUND VAC;  Surgeon: Balaji Ochoa MD;  Location: [facility] OR;  Service: General   • HYSTERECTOMY           Medications Prior to Admission:    (Not in a hospital admission)      Allergies:  Cefaclor, Cephalexin, Penicillins, and Sulfa antibiotics    Social History:   TOBACCO:   reports that she has been smoking cigarettes. She has a 20.00 pack-year smoking history. She has never used smokeless tobacco.  ETOH:   reports no history of alcohol use.  DRUGS:   reports no history of drug use.  MARITAL STATUS:    OCCUPATION:  Not working  Patient currently lives with Family      Family History:   Family History   Problem Relation Age of Onset   • Cancer Mother    • Diabetes Mother    • Cancer Father    • Heart disease Father    • Lung disease Father      REVIEW OF SYSTEMS:  Constitutional: neg  CV: neg  Pulmonary: neg  GI: N/V  : neg  Psych: neg  Neuro: neg  Skin: neg  MusculoSkeletal: neg  HEENT: neg  Joints: neg  Vitals:  /56   Pulse 110   Temp 98.2 °F (36.8 °C)   Resp 17   Ht 167.6 cm (66\")   Wt 50.3 kg (111 lb)   SpO2 98%   BMI 17.92 kg/m²     PHYSICAL EXAM:  Gen: NAD, alert, pleasant  HEENT: " WNL  Lymph: no LAD  Neck: no JVD or masses  Chest: CTA bilat  CV: RRR  Abdomen: NT/ND  Extrem: no C/C/E  Neuro: non focal  Skin: no rashes  Joints: no redness  DATA:  I have reviewed the admission labs and imaging tests.    ASSESSMENT AND PLAN:      DKA----continue with DKA protocol, insulin gtt, fluids, follow labs  DM1---check hgA1C, poor control  Depression      Berhane Mendoza MD  12:17 CST 1/4/2022

## 2022-01-05 VITALS
WEIGHT: 112.88 LBS | TEMPERATURE: 98.2 F | OXYGEN SATURATION: 100 % | SYSTOLIC BLOOD PRESSURE: 97 MMHG | DIASTOLIC BLOOD PRESSURE: 58 MMHG | HEART RATE: 90 BPM | BODY MASS INDEX: 17.72 KG/M2 | HEIGHT: 67 IN | RESPIRATION RATE: 18 BRPM

## 2022-01-05 LAB
ALBUMIN SERPL-MCNC: 4.1 G/DL (ref 3.5–5.2)
ALBUMIN/GLOB SERPL: 2.1 G/DL
ALP SERPL-CCNC: 78 U/L (ref 39–117)
ALT SERPL W P-5'-P-CCNC: 23 U/L (ref 1–33)
ANION GAP SERPL CALCULATED.3IONS-SCNC: 10 MMOL/L (ref 5–15)
AST SERPL-CCNC: 12 U/L (ref 1–32)
BASOPHILS # BLD AUTO: 0.04 10*3/MM3 (ref 0–0.2)
BASOPHILS NFR BLD AUTO: 0.6 % (ref 0–1.5)
BILIRUB SERPL-MCNC: 0.4 MG/DL (ref 0–1.2)
BUN SERPL-MCNC: 12 MG/DL (ref 6–20)
BUN/CREAT SERPL: 20.3 (ref 7–25)
CALCIUM SPEC-SCNC: 8.9 MG/DL (ref 8.6–10.5)
CHLORIDE SERPL-SCNC: 104 MMOL/L (ref 98–107)
CO2 SERPL-SCNC: 26 MMOL/L (ref 22–29)
CREAT SERPL-MCNC: 0.59 MG/DL (ref 0.57–1)
DEPRECATED RDW RBC AUTO: 35.4 FL (ref 37–54)
EOSINOPHIL # BLD AUTO: 0.08 10*3/MM3 (ref 0–0.4)
EOSINOPHIL NFR BLD AUTO: 1.2 % (ref 0.3–6.2)
ERYTHROCYTE [DISTWIDTH] IN BLOOD BY AUTOMATED COUNT: 11.4 % (ref 12.3–15.4)
GFR SERPL CREATININE-BSD FRML MDRD: 111 ML/MIN/1.73
GLOBULIN UR ELPH-MCNC: 2 GM/DL
GLUCOSE BLDC GLUCOMTR-MCNC: 280 MG/DL (ref 70–130)
GLUCOSE BLDC GLUCOMTR-MCNC: 69 MG/DL (ref 70–130)
GLUCOSE BLDC GLUCOMTR-MCNC: 95 MG/DL (ref 70–130)
GLUCOSE SERPL-MCNC: 68 MG/DL (ref 65–99)
HCT VFR BLD AUTO: 37.8 % (ref 34–46.6)
HGB BLD-MCNC: 13.5 G/DL (ref 12–15.9)
IMM GRANULOCYTES # BLD AUTO: 0.02 10*3/MM3 (ref 0–0.05)
IMM GRANULOCYTES NFR BLD AUTO: 0.3 % (ref 0–0.5)
LYMPHOCYTES # BLD AUTO: 2.58 10*3/MM3 (ref 0.7–3.1)
LYMPHOCYTES NFR BLD AUTO: 38.1 % (ref 19.6–45.3)
MCH RBC QN AUTO: 30.4 PG (ref 26.6–33)
MCHC RBC AUTO-ENTMCNC: 35.7 G/DL (ref 31.5–35.7)
MCV RBC AUTO: 85.1 FL (ref 79–97)
MONOCYTES # BLD AUTO: 0.65 10*3/MM3 (ref 0.1–0.9)
MONOCYTES NFR BLD AUTO: 9.6 % (ref 5–12)
NEUTROPHILS NFR BLD AUTO: 3.4 10*3/MM3 (ref 1.7–7)
NEUTROPHILS NFR BLD AUTO: 50.2 % (ref 42.7–76)
NRBC BLD AUTO-RTO: 0 /100 WBC (ref 0–0.2)
PLATELET # BLD AUTO: 271 10*3/MM3 (ref 140–450)
PMV BLD AUTO: 9.5 FL (ref 6–12)
POTASSIUM SERPL-SCNC: 3.4 MMOL/L (ref 3.5–5.2)
PROT SERPL-MCNC: 6.1 G/DL (ref 6–8.5)
RBC # BLD AUTO: 4.44 10*6/MM3 (ref 3.77–5.28)
SODIUM SERPL-SCNC: 140 MMOL/L (ref 136–145)
WBC NRBC COR # BLD: 6.77 10*3/MM3 (ref 3.4–10.8)

## 2022-01-05 PROCEDURE — 80053 COMPREHEN METABOLIC PANEL: CPT | Performed by: INTERNAL MEDICINE

## 2022-01-05 PROCEDURE — 36415 COLL VENOUS BLD VENIPUNCTURE: CPT | Performed by: INTERNAL MEDICINE

## 2022-01-05 PROCEDURE — 25010000002 ONDANSETRON PER 1 MG: Performed by: INTERNAL MEDICINE

## 2022-01-05 PROCEDURE — 63710000001 INSULIN LISPRO (HUMAN) PER 5 UNITS: Performed by: INTERNAL MEDICINE

## 2022-01-05 PROCEDURE — 85025 COMPLETE CBC W/AUTO DIFF WBC: CPT | Performed by: INTERNAL MEDICINE

## 2022-01-05 PROCEDURE — 82962 GLUCOSE BLOOD TEST: CPT

## 2022-01-05 RX ORDER — VIT C/B6/B5/MAGNESIUM/HERB 173 50-5-6-5MG
500 CAPSULE ORAL NIGHTLY
COMMUNITY

## 2022-01-05 RX ORDER — CLONAZEPAM 1 MG/1
1 TABLET ORAL EVERY 8 HOURS
COMMUNITY

## 2022-01-05 RX ORDER — POTASSIUM CHLORIDE 750 MG/1
40 CAPSULE, EXTENDED RELEASE ORAL ONCE
Status: COMPLETED | OUTPATIENT
Start: 2022-01-05 | End: 2022-01-05

## 2022-01-05 RX ADMIN — CLONAZEPAM 1 MG: 1 TABLET ORAL at 06:05

## 2022-01-05 RX ADMIN — SODIUM CHLORIDE, PRESERVATIVE FREE 3 ML: 5 INJECTION INTRAVENOUS at 09:00

## 2022-01-05 RX ADMIN — GABAPENTIN 200 MG: 100 CAPSULE ORAL at 06:05

## 2022-01-05 RX ADMIN — INSULIN LISPRO 4 UNITS: 100 INJECTION, SOLUTION INTRAVENOUS; SUBCUTANEOUS at 11:59

## 2022-01-05 RX ADMIN — GABAPENTIN 200 MG: 100 CAPSULE ORAL at 13:52

## 2022-01-05 RX ADMIN — CLONAZEPAM 1 MG: 1 TABLET ORAL at 13:52

## 2022-01-05 RX ADMIN — TRAMADOL HYDROCHLORIDE 50 MG: 50 TABLET, COATED ORAL at 11:59

## 2022-01-05 RX ADMIN — POTASSIUM CHLORIDE 40 MEQ: 10 CAPSULE, COATED, EXTENDED RELEASE ORAL at 14:29

## 2022-01-05 RX ADMIN — INSULIN LISPRO 4 UNITS: 100 INJECTION, SOLUTION INTRAVENOUS; SUBCUTANEOUS at 08:40

## 2022-01-05 RX ADMIN — ONDANSETRON 4 MG: 2 INJECTION INTRAMUSCULAR; INTRAVENOUS at 13:56

## 2022-01-05 RX ADMIN — ONDANSETRON 4 MG: 2 INJECTION INTRAMUSCULAR; INTRAVENOUS at 06:04

## 2022-01-05 NOTE — ED NOTES
I spoke with Dr Ferrell over the phone regarding new labs. He states pt can get 24 units of levemir, then 1 hr later take pt off drips and patient can go to a regular floor instead of critical care. Pt can have diabetic diet. Pt can also have zofran 4 mg every 6 hrs.     Rsoy Wise, MELANIE  01/04/22 5503

## 2022-01-05 NOTE — ED NOTES
Pt had been stating that her left IV has been hurting all day. It was not infiltrated. When staff left the room her IV came out.     Rosy Wise RN  01/04/22 2003

## 2022-01-05 NOTE — PLAN OF CARE
Goal Outcome Evaluation:      A&Ox4.  From ED tonight.  VSS.  BG monitoring.  RA.  Tele in place. NS.  Resting well tonight.  Ambulates well to RR.  Will continue to monitor.

## 2022-01-05 NOTE — ED NOTES
Pt states she needs her home  Medications. I told pt doctor did not address her home medications and we would have to place a call to him.     Rosy Wise RN  01/04/22 2008

## 2022-01-05 NOTE — PLAN OF CARE
Goal Outcome Evaluation:  Plan of Care Reviewed With: patient        Progress: no change  Outcome Summary: A&OX4. VSS. Independently ambulating in room and to bathroom. C/o diarrhea, MD called. C/o head and back ache PRN Ultram given with relief. Tele on running NSR. Voiding w/o difficulty. ACHS accuchecks witH SSI. D/c home this shift. Safety maintained and continue to monitor.

## 2022-01-06 LAB
QT INTERVAL: 380 MS
QTC INTERVAL: 516 MS

## 2022-01-06 NOTE — PAYOR COMM NOTE
"REF: [id number]    [facility]  [place],   [phone]  OR  FAX  [phone]          Clary Martin (41 y.o. Female)             Date of Birth Social Security Number Address Home Phone MRN    1980  [id number] [address] [phone] [id number]    [employer] Marital Status             [facility] Single       Admission Date Admission Type Admitting Provider Attending Provider Department, Room/Bed    1/4/22 Emergency Berhane Mendoza MD  [facility] 3A, 344/1    Discharge Date Discharge Disposition Discharge Destination          1/5/2022 Home or Self Care              Attending Provider: (none)   Allergies: Cefaclor, Cephalexin, Penicillins, Sulfa Antibiotics    Isolation: None   Infection: None   Code Status: Prior   Advance Care Planning Activity    Ht: 170.7 cm (67.2\")   Wt: 51.2 kg (112 lb 14 oz)    Admission Cmt: None   Principal Problem: None                Active Insurance as of 1/4/2022     Primary Coverage     Payor Plan Insurance Group Employer/Plan Group    MEDICARE MEDICARE A & B      Payor Plan Address Payor Plan Phone Number Payor Plan Fax Number Effective Dates    [address] [phone]  9/1/2010 - None Entered    [facility] [address]       Subscriber Name Subscriber Birth Date Member ID       CLARY MARTIN 1980 [id number]           Secondary Coverage     Payor Plan Insurance Group Employer/Plan Group    [employer] [place] WELL[place]ARE MEDICAID      Payor Plan Address Payor Plan Phone Number Payor Plan Fax Number Effective Dates    [address] [address] [phone]  1/29/2018 - None Entered    [facility] [id number]       Subscriber Name Subscriber Birth Date Member ID       CLARY MARTIN 1980 [id number]                 Emergency Contacts      (Rel.) Home Phone Work Phone Mobile Phone    Kayla Key (Mother) [phone] -- [phone]                     Discharge Order (From admission, onward)     Start     Ordered    01/05/22 1359  Discharge " patient  Once        Comments: OK for d/c  Give MicroK + 40 meq once as ordered by mouth before d/c  F/U with me in 1-2 weeks  Monitor glucose, record and bring to follow up   Expected Discharge Date: 01/05/22    Discharge Disposition: Home or Self Care    Physician of Record for Attribution - Please select from Treatment Team: LUZ GARCIA [[id number]]    Review needed by CMO to determine Physician of Record: No       Question Answer Comment   Physician of Record for Attribution - Please select from Treatment Team LUZ GARCIA    Review needed by CMO to determine Physician of Record No        01/05/22 1400

## 2022-07-13 LAB
ALBUMIN SERPL-MCNC: 4.5 G/DL (ref 3.5–5.2)
ALP BLD-CCNC: 129 U/L (ref 35–104)
ALT SERPL-CCNC: 24 U/L (ref 5–33)
ANION GAP SERPL CALCULATED.3IONS-SCNC: 12 MMOL/L (ref 7–19)
AST SERPL-CCNC: 15 U/L (ref 5–32)
BASOPHILS ABSOLUTE: 0 K/UL (ref 0–0.2)
BASOPHILS RELATIVE PERCENT: 0.7 % (ref 0–1)
BILIRUB SERPL-MCNC: <0.2 MG/DL (ref 0.2–1.2)
BUN BLDV-MCNC: 13 MG/DL (ref 6–20)
CALCIUM SERPL-MCNC: 9.1 MG/DL (ref 8.6–10)
CHLORIDE BLD-SCNC: 102 MMOL/L (ref 98–111)
CHOLESTEROL, TOTAL: 156 MG/DL (ref 160–199)
CO2: 26 MMOL/L (ref 22–29)
CREAT SERPL-MCNC: 0.7 MG/DL (ref 0.5–0.9)
CREATININE URINE: 129 MG/DL (ref 4.2–622)
EOSINOPHILS ABSOLUTE: 0.1 K/UL (ref 0–0.6)
EOSINOPHILS RELATIVE PERCENT: 2.3 % (ref 0–5)
GFR AFRICAN AMERICAN: >59
GFR NON-AFRICAN AMERICAN: >60
GLUCOSE BLD-MCNC: 236 MG/DL (ref 74–109)
HBA1C MFR BLD: 8.1 % (ref 4–6)
HCT VFR BLD CALC: 43.3 % (ref 37–47)
HDLC SERPL-MCNC: 50 MG/DL (ref 65–121)
HEMOGLOBIN: 14 G/DL (ref 12–16)
IMMATURE GRANULOCYTES #: 0 K/UL
LDL CHOLESTEROL CALCULATED: 79 MG/DL
LYMPHOCYTES ABSOLUTE: 2.3 K/UL (ref 1.1–4.5)
LYMPHOCYTES RELATIVE PERCENT: 39.2 % (ref 20–40)
MCH RBC QN AUTO: 30.7 PG (ref 27–31)
MCHC RBC AUTO-ENTMCNC: 32.3 G/DL (ref 33–37)
MCV RBC AUTO: 95 FL (ref 81–99)
MICROALBUMIN UR-MCNC: 3.2 MG/DL (ref 0–19)
MICROALBUMIN/CREAT UR-RTO: 24.8 MG/G
MONOCYTES ABSOLUTE: 0.5 K/UL (ref 0–0.9)
MONOCYTES RELATIVE PERCENT: 8.9 % (ref 0–10)
NEUTROPHILS ABSOLUTE: 2.8 K/UL (ref 1.5–7.5)
NEUTROPHILS RELATIVE PERCENT: 48.7 % (ref 50–65)
PDW BLD-RTO: 12 % (ref 11.5–14.5)
PLATELET # BLD: 292 K/UL (ref 130–400)
PMV BLD AUTO: 10.1 FL (ref 9.4–12.3)
POTASSIUM SERPL-SCNC: 4.2 MMOL/L (ref 3.5–5)
RBC # BLD: 4.56 M/UL (ref 4.2–5.4)
SODIUM BLD-SCNC: 140 MMOL/L (ref 136–145)
T4 FREE: 1.2 NG/DL (ref 0.93–1.7)
TOTAL PROTEIN: 7.4 G/DL (ref 6.6–8.7)
TRIGL SERPL-MCNC: 135 MG/DL (ref 0–149)
TSH SERPL DL<=0.05 MIU/L-ACNC: 3.01 UIU/ML (ref 0.27–4.2)
VITAMIN B-12: 374 PG/ML (ref 211–946)
WBC # BLD: 5.8 K/UL (ref 4.8–10.8)

## 2022-07-21 ENCOUNTER — HOSPITAL ENCOUNTER (EMERGENCY)
Facility: HOSPITAL | Age: 42
Discharge: LEFT BEFORE DISCHARGE | End: 2022-07-21
Attending: EMERGENCY MEDICINE | Admitting: EMERGENCY MEDICINE

## 2022-07-21 VITALS
TEMPERATURE: 98.1 F | BODY MASS INDEX: 18.78 KG/M2 | DIASTOLIC BLOOD PRESSURE: 68 MMHG | RESPIRATION RATE: 16 BRPM | OXYGEN SATURATION: 99 % | HEART RATE: 100 BPM | SYSTOLIC BLOOD PRESSURE: 99 MMHG | HEIGHT: 64 IN | WEIGHT: 110 LBS

## 2022-07-21 DIAGNOSIS — R11.0 NAUSEA: ICD-10-CM

## 2022-07-21 DIAGNOSIS — R73.9 HYPERGLYCEMIA: Primary | ICD-10-CM

## 2022-07-21 LAB
ATMOSPHERIC PRESS: 748 MMHG
BACTERIA UR QL AUTO: ABNORMAL /HPF
BASE EXCESS BLDV CALC-SCNC: 0.5 MMOL/L (ref 0–2)
BASOPHILS # BLD AUTO: 0.01 10*3/MM3 (ref 0–0.2)
BASOPHILS NFR BLD AUTO: 0.4 % (ref 0–1.5)
BDY SITE: NORMAL
BILIRUB UR QL STRIP: ABNORMAL
BODY TEMPERATURE: 37 C
CLARITY UR: CLEAR
COLOR UR: ABNORMAL
DEPRECATED RDW RBC AUTO: 39.8 FL (ref 37–54)
EOSINOPHIL # BLD AUTO: 0.01 10*3/MM3 (ref 0–0.4)
EOSINOPHIL NFR BLD AUTO: 0.4 % (ref 0.3–6.2)
ERYTHROCYTE [DISTWIDTH] IN BLOOD BY AUTOMATED COUNT: 12.1 % (ref 12.3–15.4)
GLUCOSE BLDC GLUCOMTR-MCNC: 203 MG/DL (ref 70–130)
GLUCOSE UR STRIP-MCNC: ABNORMAL MG/DL
HCO3 BLDV-SCNC: 26.6 MMOL/L (ref 22–28)
HCT VFR BLD AUTO: 41.6 % (ref 34–46.6)
HGB BLD-MCNC: 14.3 G/DL (ref 12–15.9)
HGB UR QL STRIP.AUTO: NEGATIVE
HYALINE CASTS UR QL AUTO: ABNORMAL /LPF
IMM GRANULOCYTES # BLD AUTO: 0 10*3/MM3 (ref 0–0.05)
IMM GRANULOCYTES NFR BLD AUTO: 0 % (ref 0–0.5)
KETONES UR QL STRIP: ABNORMAL
LEUKOCYTE ESTERASE UR QL STRIP.AUTO: NEGATIVE
LYMPHOCYTES # BLD AUTO: 1.18 10*3/MM3 (ref 0.7–3.1)
LYMPHOCYTES NFR BLD AUTO: 42.8 % (ref 19.6–45.3)
Lab: NORMAL
MCH RBC QN AUTO: 30.9 PG (ref 26.6–33)
MCHC RBC AUTO-ENTMCNC: 34.4 G/DL (ref 31.5–35.7)
MCV RBC AUTO: 89.8 FL (ref 79–97)
MODALITY: NORMAL
MONOCYTES # BLD AUTO: 0.27 10*3/MM3 (ref 0.1–0.9)
MONOCYTES NFR BLD AUTO: 9.8 % (ref 5–12)
NEUTROPHILS NFR BLD AUTO: 1.29 10*3/MM3 (ref 1.7–7)
NEUTROPHILS NFR BLD AUTO: 46.6 % (ref 42.7–76)
NITRITE UR QL STRIP: NEGATIVE
NRBC BLD AUTO-RTO: 0 /100 WBC (ref 0–0.2)
PCO2 BLDV: 47.3 MM HG (ref 41–51)
PH BLDV: 7.36 PH UNITS (ref 7.32–7.42)
PH UR STRIP.AUTO: 6 [PH] (ref 5–8)
PLATELET # BLD AUTO: 179 10*3/MM3 (ref 140–450)
PMV BLD AUTO: 10.1 FL (ref 6–12)
PO2 BLDV: 30.2 MM HG (ref 27–53)
PROT UR QL STRIP: ABNORMAL
RBC # BLD AUTO: 4.63 10*6/MM3 (ref 3.77–5.28)
RBC # UR STRIP: ABNORMAL /HPF
REF LAB TEST METHOD: ABNORMAL
SAO2 % BLDCOV: 60.6 % (ref 45–75)
SP GR UR STRIP: 1.02 (ref 1–1.03)
SQUAMOUS #/AREA URNS HPF: ABNORMAL /HPF
UROBILINOGEN UR QL STRIP: ABNORMAL
VENTILATOR MODE: NORMAL
WBC # UR STRIP: ABNORMAL /HPF
WBC NRBC COR # BLD: 2.76 10*3/MM3 (ref 3.4–10.8)

## 2022-07-21 PROCEDURE — 96374 THER/PROPH/DIAG INJ IV PUSH: CPT

## 2022-07-21 PROCEDURE — 81001 URINALYSIS AUTO W/SCOPE: CPT | Performed by: EMERGENCY MEDICINE

## 2022-07-21 PROCEDURE — 25010000002 METOCLOPRAMIDE PER 10 MG: Performed by: EMERGENCY MEDICINE

## 2022-07-21 PROCEDURE — 82962 GLUCOSE BLOOD TEST: CPT

## 2022-07-21 PROCEDURE — 96375 TX/PRO/DX INJ NEW DRUG ADDON: CPT

## 2022-07-21 PROCEDURE — 99282 EMERGENCY DEPT VISIT SF MDM: CPT

## 2022-07-21 PROCEDURE — 82803 BLOOD GASES ANY COMBINATION: CPT

## 2022-07-21 PROCEDURE — 85025 COMPLETE CBC W/AUTO DIFF WBC: CPT | Performed by: EMERGENCY MEDICINE

## 2022-07-21 RX ORDER — FAMOTIDINE 10 MG/ML
20 INJECTION, SOLUTION INTRAVENOUS ONCE
Status: COMPLETED | OUTPATIENT
Start: 2022-07-21 | End: 2022-07-21

## 2022-07-21 RX ORDER — METOCLOPRAMIDE HYDROCHLORIDE 5 MG/ML
10 INJECTION INTRAMUSCULAR; INTRAVENOUS ONCE
Status: COMPLETED | OUTPATIENT
Start: 2022-07-21 | End: 2022-07-21

## 2022-07-21 RX ADMIN — METOCLOPRAMIDE HYDROCHLORIDE 10 MG: 5 INJECTION INTRAMUSCULAR; INTRAVENOUS at 13:19

## 2022-07-21 RX ADMIN — FAMOTIDINE 20 MG: 10 INJECTION, SOLUTION INTRAVENOUS at 13:19

## 2022-07-21 RX ADMIN — SODIUM CHLORIDE 1000 ML: 9 INJECTION, SOLUTION INTRAVENOUS at 13:18

## 2022-07-21 NOTE — ED PROVIDER NOTES
Subjective   Patient given to the ED with elevated blood sugar and fatigue has got history of COVID      Fatigue  Location:  Generalized fatigue  Severity:  Moderate  Onset quality:  Gradual  Timing:  Constant  Progression:  Worsening  Chronicity:  New  Associated symptoms: fatigue, myalgias and nausea    Associated symptoms: no abdominal pain, no chest pain, no congestion, no cough, no diarrhea, no ear pain, no fever, no headaches, no loss of consciousness, no rhinorrhea, no shortness of breath, no sore throat, no vomiting and no wheezing    Hyperglycemia  Associated symptoms: fatigue and nausea    Associated symptoms: no abdominal pain, no chest pain, no fever, no shortness of breath and no vomiting        Review of Systems   Constitutional: Positive for fatigue. Negative for fever.   HENT: Negative.  Negative for congestion, ear pain, rhinorrhea and sore throat.    Eyes: Negative.    Respiratory: Negative.  Negative for cough, shortness of breath and wheezing.    Cardiovascular: Negative.  Negative for chest pain.   Gastrointestinal: Positive for nausea. Negative for abdominal pain, diarrhea and vomiting.   Endocrine: Negative.    Genitourinary: Negative.    Musculoskeletal: Positive for myalgias.   Skin: Negative.    Neurological: Negative.  Negative for loss of consciousness and headaches.   Hematological: Negative.    All other systems reviewed and are negative.      Past Medical History:   Diagnosis Date   • Allergic rhinitis    • Anxiety    • Arthritis    • Colitis    • Depression    • Fibromyalgia    • Gallbladder abscess    • GERD (gastroesophageal reflux disease)    • OCD (obsessive compulsive disorder)    • Peripheral neuropathy    • Type 1 diabetes (HCC)        Allergies   Allergen Reactions   • Cephalexin    • Effexor [Venlafaxine] Other (See Comments)     Unknown    • Erythromycin Other (See Comments)     Unknown    • Penicillins    • Sulfa Antibiotics    • Cefaclor Hives       Past Surgical History:    Procedure Laterality Date   • BREAST CYST EXCISION     • CHOLECYSTECTOMY     • GROIN ABSCESS INCISION AND DRAINAGE Right 08/17/2018    Procedure: INCISION AND DRAINAGE RIGHT INGUINAL ABSCESS WITH APPLICATION OF WOUND VAC;  Surgeon: Balaji Ochoa MD;  Location:  [facility] OR;  Service: General   • HYSTERECTOMY         Family History   Problem Relation Age of Onset   • Cancer Mother    • Diabetes Mother    • Cancer Father    • Heart disease Father    • Lung disease Father        Social History     Socioeconomic History   • Marital status: Single   Tobacco Use   • Smoking status: Current Every Day Smoker     Packs/day: 1.00     Years: 20.00     Pack years: 20.00     Types: Cigarettes   • Smokeless tobacco: Never Used   Vaping Use   • Vaping Use: Never used   Substance and Sexual Activity   • Alcohol use: No   • Drug use: Never   • Sexual activity: Yes           Objective   Physical Exam  Vitals and nursing note reviewed. Exam conducted with a chaperone present.   Constitutional:       General: She is awake.      Appearance: Normal appearance. She is well-developed. She is not ill-appearing.   HENT:      Head: Normocephalic and atraumatic.   Eyes:      General: Lids are normal.      Conjunctiva/sclera: Conjunctivae normal.      Pupils: Pupils are equal, round, and reactive to light.   Cardiovascular:      Rate and Rhythm: Regular rhythm. Tachycardia present.      Chest Wall: PMI is not displaced.      Pulses: Normal pulses.      Heart sounds: Normal heart sounds.   Pulmonary:      Effort: Pulmonary effort is normal.      Breath sounds: Normal breath sounds. No decreased breath sounds.   Abdominal:      General: Abdomen is flat. Bowel sounds are normal.      Palpations: Abdomen is soft.      Tenderness: There is no abdominal tenderness.   Musculoskeletal:         General: Normal range of motion.      Cervical back: Full passive range of motion without pain, normal range of motion and neck supple.   Skin:     General:  Skin is warm and dry.      Capillary Refill: Capillary refill takes less than 2 seconds.   Neurological:      General: No focal deficit present.      Mental Status: She is alert and oriented to person, place, and time.      Cranial Nerves: Cranial nerves are intact.      Motor: Motor function is intact.      Deep Tendon Reflexes: Reflexes are normal and symmetric.   Psychiatric:         Behavior: Behavior is cooperative.         Procedures           ED Course  ED Course as of 07/21/22 1410   Thu Jul 21, 2022   1408 Patient is not wanting to stay for the rest of lab work-up to come back.  Nursing staff discussed this with her at length the possibility of increased morbidity mortality associate with incomplete work-up has been explained.  Patient understands that but does not want to stay wants to go home she did not receive her entire IV fluids.  She has been advised return the ER for any worsening symptoms she is awake alert appears to be able to make appropriate decisions.  And wants to go home will be signing out AGAINST MEDICAL ADVICE. [TS]   1409 Patient advised by nursing staff to return back to the ER if she change her mind if her condition worsens and to have close follow-up with the primary MD. [TS]      ED Course User Index  [TS] Jerrell Grant MD                                           MDM  Number of Diagnoses or Management Options  Diagnosis management comments: Generalized fatigue viral syndrome       Amount and/or Complexity of Data Reviewed  Clinical lab tests: ordered and reviewed  Tests in the radiology section of CPT®: ordered and reviewed  Tests in the medicine section of CPT®: reviewed and ordered    Risk of Complications, Morbidity, and/or Mortality  Presenting problems: low  Diagnostic procedures: low  Management options: low        Final diagnoses:   Hyperglycemia   Nausea       ED Disposition  ED Disposition     None          No follow-up provider specified.       Medication List      No  changes were made to your prescriptions during this visit.          Jerrell Grant MD  07/21/22 1307       Jerrell Grant MD  07/21/22 1410

## 2023-01-23 LAB
ALBUMIN SERPL-MCNC: 4.3 G/DL (ref 3.5–5.2)
ALP BLD-CCNC: 125 U/L (ref 35–104)
ALT SERPL-CCNC: 45 U/L (ref 5–33)
ANION GAP SERPL CALCULATED.3IONS-SCNC: 9 MMOL/L (ref 7–19)
AST SERPL-CCNC: 25 U/L (ref 5–32)
BASOPHILS ABSOLUTE: 0.1 K/UL (ref 0–0.2)
BASOPHILS RELATIVE PERCENT: 0.8 % (ref 0–1)
BILIRUB SERPL-MCNC: <0.2 MG/DL (ref 0.2–1.2)
BUN BLDV-MCNC: 16 MG/DL (ref 6–20)
CALCIUM SERPL-MCNC: 8.6 MG/DL (ref 8.6–10)
CHLORIDE BLD-SCNC: 99 MMOL/L (ref 98–111)
CHOLESTEROL, TOTAL: 162 MG/DL (ref 160–199)
CO2: 28 MMOL/L (ref 22–29)
CREAT SERPL-MCNC: 0.6 MG/DL (ref 0.5–0.9)
EOSINOPHILS ABSOLUTE: 0.1 K/UL (ref 0–0.6)
EOSINOPHILS RELATIVE PERCENT: 1.8 % (ref 0–5)
GFR SERPL CREATININE-BSD FRML MDRD: >60 ML/MIN/{1.73_M2}
GLUCOSE BLD-MCNC: 339 MG/DL (ref 74–109)
HBA1C MFR BLD: 10 % (ref 4–6)
HCT VFR BLD CALC: 45 % (ref 37–47)
HDLC SERPL-MCNC: 61 MG/DL (ref 65–121)
HEMOGLOBIN: 15.3 G/DL (ref 12–16)
IMMATURE GRANULOCYTES #: 0 K/UL
LDL CHOLESTEROL CALCULATED: 72 MG/DL
LYMPHOCYTES ABSOLUTE: 2.3 K/UL (ref 1.1–4.5)
LYMPHOCYTES RELATIVE PERCENT: 38.3 % (ref 20–40)
MCH RBC QN AUTO: 31.4 PG (ref 27–31)
MCHC RBC AUTO-ENTMCNC: 34 G/DL (ref 33–37)
MCV RBC AUTO: 92.2 FL (ref 81–99)
MONOCYTES ABSOLUTE: 0.5 K/UL (ref 0–0.9)
MONOCYTES RELATIVE PERCENT: 8.8 % (ref 0–10)
NEUTROPHILS ABSOLUTE: 3 K/UL (ref 1.5–7.5)
NEUTROPHILS RELATIVE PERCENT: 50.1 % (ref 50–65)
PDW BLD-RTO: 12 % (ref 11.5–14.5)
PLATELET # BLD: 248 K/UL (ref 130–400)
PMV BLD AUTO: 9.7 FL (ref 9.4–12.3)
POTASSIUM SERPL-SCNC: 4.7 MMOL/L (ref 3.5–5)
RBC # BLD: 4.88 M/UL (ref 4.2–5.4)
SODIUM BLD-SCNC: 136 MMOL/L (ref 136–145)
TOTAL PROTEIN: 6.6 G/DL (ref 6.6–8.7)
TRIGL SERPL-MCNC: 146 MG/DL (ref 0–149)
VITAMIN D 25-HYDROXY: <6 NG/ML
WBC # BLD: 6 K/UL (ref 4.8–10.8)

## 2023-02-02 ENCOUNTER — TRANSCRIBE ORDERS (OUTPATIENT)
Dept: PHYSICAL THERAPY | Facility: CLINIC | Age: 43
End: 2023-02-02
Payer: MEDICARE

## 2023-02-02 DIAGNOSIS — R53.1 WEAKNESS: Primary | ICD-10-CM

## 2023-02-03 ENCOUNTER — TELEPHONE (OUTPATIENT)
Dept: PHYSICAL THERAPY | Facility: CLINIC | Age: 43
End: 2023-02-03

## 2023-02-03 NOTE — TELEPHONE ENCOUNTER
Caller: Misa Martin    Relationship: Self       What was the call regarding: CHILD HAS COVID

## 2023-04-25 ENCOUNTER — TELEPHONE (OUTPATIENT)
Dept: PHYSICAL THERAPY | Facility: CLINIC | Age: 43
End: 2023-04-25

## 2023-11-28 LAB
25(OH)D3 SERPL-MCNC: <6 NG/ML
ALBUMIN SERPL-MCNC: 4.4 G/DL (ref 3.5–5.2)
ALP SERPL-CCNC: 120 U/L (ref 35–104)
ALT SERPL-CCNC: 26 U/L (ref 5–33)
ANION GAP SERPL CALCULATED.3IONS-SCNC: 12 MMOL/L (ref 7–19)
AST SERPL-CCNC: 20 U/L (ref 5–32)
BASOPHILS # BLD: 0.1 K/UL (ref 0–0.2)
BASOPHILS NFR BLD: 0.8 % (ref 0–1)
BILIRUB SERPL-MCNC: 0.6 MG/DL (ref 0.2–1.2)
BUN SERPL-MCNC: 5 MG/DL (ref 6–20)
CALCIUM SERPL-MCNC: 8.9 MG/DL (ref 8.6–10)
CHLORIDE SERPL-SCNC: 96 MMOL/L (ref 98–111)
CHOLEST SERPL-MCNC: 169 MG/DL (ref 160–199)
CO2 SERPL-SCNC: 25 MMOL/L (ref 22–29)
CREAT SERPL-MCNC: 0.8 MG/DL (ref 0.5–0.9)
CREAT UR-MCNC: 59.6 MG/DL (ref 28–217)
EOSINOPHIL # BLD: 0.2 K/UL (ref 0–0.6)
EOSINOPHIL NFR BLD: 2.4 % (ref 0–5)
ERYTHROCYTE [DISTWIDTH] IN BLOOD BY AUTOMATED COUNT: 12.6 % (ref 11.5–14.5)
GLUCOSE SERPL-MCNC: 512 MG/DL (ref 74–109)
HBA1C MFR BLD: 8.9 % (ref 4–6)
HCT VFR BLD AUTO: 42.9 % (ref 37–47)
HDLC SERPL-MCNC: 72 MG/DL (ref 65–121)
HGB BLD-MCNC: 13.7 G/DL (ref 12–16)
IMM GRANULOCYTES # BLD: 0 K/UL
LDLC SERPL CALC-MCNC: 77 MG/DL
LYMPHOCYTES # BLD: 2.6 K/UL (ref 1.1–4.5)
LYMPHOCYTES NFR BLD: 40.9 % (ref 20–40)
MCH RBC QN AUTO: 31.3 PG (ref 27–31)
MCHC RBC AUTO-ENTMCNC: 31.9 G/DL (ref 33–37)
MCV RBC AUTO: 97.9 FL (ref 81–99)
MICROALBUMIN UR-MCNC: <1.2 MG/DL (ref 0–19)
MICROALBUMIN/CREAT UR-RTO: NORMAL MG/G
MONOCYTES # BLD: 0.5 K/UL (ref 0–0.9)
MONOCYTES NFR BLD: 8.4 % (ref 0–10)
NEUTROPHILS # BLD: 3 K/UL (ref 1.5–7.5)
NEUTS SEG NFR BLD: 47.2 % (ref 50–65)
PLATELET # BLD AUTO: 260 K/UL (ref 130–400)
PMV BLD AUTO: 12 FL (ref 9.4–12.3)
POTASSIUM SERPL-SCNC: 4.5 MMOL/L (ref 3.5–5)
PROT SERPL-MCNC: 6.6 G/DL (ref 6.6–8.7)
RBC # BLD AUTO: 4.38 M/UL (ref 4.2–5.4)
SODIUM SERPL-SCNC: 133 MMOL/L (ref 136–145)
TRIGL SERPL-MCNC: 99 MG/DL (ref 0–149)
WBC # BLD AUTO: 6.3 K/UL (ref 4.8–10.8)

## 2024-10-30 ENCOUNTER — APPOINTMENT (OUTPATIENT)
Dept: CT IMAGING | Facility: HOSPITAL | Age: 44
End: 2024-10-30
Payer: MEDICARE

## 2024-10-30 ENCOUNTER — HOSPITAL ENCOUNTER (EMERGENCY)
Facility: HOSPITAL | Age: 44
Discharge: HOME | End: 2024-10-30
Attending: FAMILY MEDICINE
Payer: MEDICARE

## 2024-10-30 VITALS
HEART RATE: 90 BPM | BODY MASS INDEX: 23.22 KG/M2 | TEMPERATURE: 98.5 F | SYSTOLIC BLOOD PRESSURE: 101 MMHG | RESPIRATION RATE: 18 BRPM | WEIGHT: 136 LBS | OXYGEN SATURATION: 99 % | DIASTOLIC BLOOD PRESSURE: 71 MMHG | HEIGHT: 64 IN

## 2024-10-30 DIAGNOSIS — K51.00 PANCOLITIS: Primary | ICD-10-CM

## 2024-10-30 DIAGNOSIS — R19.7 DIARRHEA, UNSPECIFIED TYPE: ICD-10-CM

## 2024-10-30 DIAGNOSIS — E11.65 HYPERGLYCEMIA DUE TO DIABETES MELLITUS: ICD-10-CM

## 2024-10-30 DIAGNOSIS — R11.2 NAUSEA AND VOMITING, UNSPECIFIED VOMITING TYPE: ICD-10-CM

## 2024-10-30 LAB
ADV 40+41 DNA STL QL NAA+NON-PROBE: NOT DETECTED
ALBUMIN SERPL-MCNC: 4 G/DL (ref 3.5–5.2)
ALBUMIN/GLOB SERPL: 1.5 G/DL
ALP SERPL-CCNC: 148 U/L (ref 39–117)
ALT SERPL W P-5'-P-CCNC: 70 U/L (ref 1–33)
AMPHET+METHAMPHET UR QL: NEGATIVE
AMPHETAMINES UR QL: NEGATIVE
ANION GAP SERPL CALCULATED.3IONS-SCNC: 14 MMOL/L (ref 5–15)
APTT PPP: 26.5 SECONDS (ref 24.5–36)
AST SERPL-CCNC: 53 U/L (ref 1–32)
ASTRO TYP 1-8 RNA STL QL NAA+NON-PROBE: NOT DETECTED
BACTERIA UR QL AUTO: ABNORMAL /HPF
BARBITURATES UR QL SCN: NEGATIVE
BASOPHILS # BLD AUTO: 0.04 10*3/MM3 (ref 0–0.2)
BASOPHILS NFR BLD AUTO: 0.3 % (ref 0–1.5)
BENZODIAZ UR QL SCN: POSITIVE
BILIRUB SERPL-MCNC: 0.4 MG/DL (ref 0–1.2)
BILIRUB UR QL STRIP: NEGATIVE
BUN SERPL-MCNC: 11 MG/DL (ref 6–20)
BUN/CREAT SERPL: 14.7 (ref 7–25)
BUPRENORPHINE SERPL-MCNC: NEGATIVE NG/ML
C CAYETANENSIS DNA STL QL NAA+NON-PROBE: NOT DETECTED
C COLI+JEJ+UPSA DNA STL QL NAA+NON-PROBE: NOT DETECTED
CALCIUM SPEC-SCNC: 8.8 MG/DL (ref 8.6–10.5)
CANNABINOIDS SERPL QL: NEGATIVE
CHLORIDE SERPL-SCNC: 97 MMOL/L (ref 98–107)
CLARITY UR: CLEAR
CO2 SERPL-SCNC: 27 MMOL/L (ref 22–29)
COCAINE UR QL: NEGATIVE
COLOR UR: YELLOW
CREAT SERPL-MCNC: 0.75 MG/DL (ref 0.57–1)
CRYPTOSP DNA STL QL NAA+NON-PROBE: NOT DETECTED
D-LACTATE SERPL-SCNC: 3.1 MMOL/L (ref 0.5–2)
D-LACTATE SERPL-SCNC: 3.5 MMOL/L (ref 0.5–2)
DEPRECATED RDW RBC AUTO: 45.1 FL (ref 37–54)
E HISTOLYT DNA STL QL NAA+NON-PROBE: NOT DETECTED
EAEC PAA PLAS AGGR+AATA ST NAA+NON-PRB: NOT DETECTED
EC STX1+STX2 GENES STL QL NAA+NON-PROBE: NOT DETECTED
EGFRCR SERPLBLD CKD-EPI 2021: 100.2 ML/MIN/1.73
EOSINOPHIL # BLD AUTO: 0.16 10*3/MM3 (ref 0–0.4)
EOSINOPHIL NFR BLD AUTO: 1.3 % (ref 0.3–6.2)
EPEC EAE GENE STL QL NAA+NON-PROBE: NOT DETECTED
ERYTHROCYTE [DISTWIDTH] IN BLOOD BY AUTOMATED COUNT: 12.9 % (ref 12.3–15.4)
ETEC LTA+ST1A+ST1B TOX ST NAA+NON-PROBE: NOT DETECTED
FENTANYL UR-MCNC: NEGATIVE NG/ML
G LAMBLIA DNA STL QL NAA+NON-PROBE: NOT DETECTED
GLOBULIN UR ELPH-MCNC: 2.6 GM/DL
GLUCOSE BLDC GLUCOMTR-MCNC: 136 MG/DL (ref 70–130)
GLUCOSE SERPL-MCNC: 364 MG/DL (ref 65–99)
GLUCOSE UR STRIP-MCNC: ABNORMAL MG/DL
HCT VFR BLD AUTO: 39.4 % (ref 34–46.6)
HGB BLD-MCNC: 12.9 G/DL (ref 12–15.9)
HGB UR QL STRIP.AUTO: ABNORMAL
HOLD SPECIMEN: NORMAL
HYALINE CASTS UR QL AUTO: ABNORMAL /LPF
IMM GRANULOCYTES # BLD AUTO: 0.06 10*3/MM3 (ref 0–0.05)
IMM GRANULOCYTES NFR BLD AUTO: 0.5 % (ref 0–0.5)
INR PPP: 0.9 (ref 0.91–1.09)
KETONES UR QL STRIP: NEGATIVE
LEUKOCYTE ESTERASE UR QL STRIP.AUTO: ABNORMAL
LIPASE SERPL-CCNC: 10 U/L (ref 13–60)
LYMPHOCYTES # BLD AUTO: 2.34 10*3/MM3 (ref 0.7–3.1)
LYMPHOCYTES NFR BLD AUTO: 18.6 % (ref 19.6–45.3)
MAGNESIUM SERPL-MCNC: 2 MG/DL (ref 1.6–2.6)
MCH RBC QN AUTO: 31.2 PG (ref 26.6–33)
MCHC RBC AUTO-ENTMCNC: 32.7 G/DL (ref 31.5–35.7)
MCV RBC AUTO: 95.4 FL (ref 79–97)
METHADONE UR QL SCN: NEGATIVE
MONOCYTES # BLD AUTO: 0.84 10*3/MM3 (ref 0.1–0.9)
MONOCYTES NFR BLD AUTO: 6.7 % (ref 5–12)
NEUTROPHILS NFR BLD AUTO: 72.6 % (ref 42.7–76)
NEUTROPHILS NFR BLD AUTO: 9.17 10*3/MM3 (ref 1.7–7)
NITRITE UR QL STRIP: NEGATIVE
NOROVIRUS GI+II RNA STL QL NAA+NON-PROBE: NOT DETECTED
NRBC BLD AUTO-RTO: 0 /100 WBC (ref 0–0.2)
OPIATES UR QL: POSITIVE
OXYCODONE UR QL SCN: NEGATIVE
P SHIGELLOIDES DNA STL QL NAA+NON-PROBE: NOT DETECTED
PCP UR QL SCN: NEGATIVE
PH UR STRIP.AUTO: 6 [PH] (ref 5–8)
PLATELET # BLD AUTO: 259 10*3/MM3 (ref 140–450)
PMV BLD AUTO: 10.9 FL (ref 6–12)
POTASSIUM SERPL-SCNC: 3.9 MMOL/L (ref 3.5–5.2)
PROT SERPL-MCNC: 6.6 G/DL (ref 6–8.5)
PROT UR QL STRIP: NEGATIVE
PROTHROMBIN TIME: 12.5 SECONDS (ref 11.8–14.8)
RBC # BLD AUTO: 4.13 10*6/MM3 (ref 3.77–5.28)
RBC # UR STRIP: ABNORMAL /HPF
REF LAB TEST METHOD: ABNORMAL
RVA RNA STL QL NAA+NON-PROBE: NOT DETECTED
S ENT+BONG DNA STL QL NAA+NON-PROBE: NOT DETECTED
SAPO I+II+IV+V RNA STL QL NAA+NON-PROBE: NOT DETECTED
SHIGELLA SP+EIEC IPAH ST NAA+NON-PROBE: NOT DETECTED
SODIUM SERPL-SCNC: 138 MMOL/L (ref 136–145)
SP GR UR STRIP: 1.02 (ref 1–1.03)
SQUAMOUS #/AREA URNS HPF: ABNORMAL /HPF
TRICYCLICS UR QL SCN: NEGATIVE
UROBILINOGEN UR QL STRIP: ABNORMAL
V CHOL+PARA+VUL DNA STL QL NAA+NON-PROBE: NOT DETECTED
V CHOLERAE DNA STL QL NAA+NON-PROBE: NOT DETECTED
WBC # UR STRIP: ABNORMAL /HPF
WBC NRBC COR # BLD AUTO: 12.61 10*3/MM3 (ref 3.4–10.8)
WHOLE BLOOD HOLD COAG: NORMAL
WHOLE BLOOD HOLD SPECIMEN: NORMAL
Y ENTEROCOL DNA STL QL NAA+NON-PROBE: NOT DETECTED

## 2024-10-30 PROCEDURE — 25510000001 IOPAMIDOL 61 % SOLUTION: Performed by: FAMILY MEDICINE

## 2024-10-30 PROCEDURE — 83735 ASSAY OF MAGNESIUM: CPT | Performed by: FAMILY MEDICINE

## 2024-10-30 PROCEDURE — 81001 URINALYSIS AUTO W/SCOPE: CPT | Performed by: FAMILY MEDICINE

## 2024-10-30 PROCEDURE — 96372 THER/PROPH/DIAG INJ SC/IM: CPT

## 2024-10-30 PROCEDURE — 83690 ASSAY OF LIPASE: CPT | Performed by: FAMILY MEDICINE

## 2024-10-30 PROCEDURE — 96375 TX/PRO/DX INJ NEW DRUG ADDON: CPT

## 2024-10-30 PROCEDURE — 63710000001 PROMETHAZINE PER 25 MG: Performed by: FAMILY MEDICINE

## 2024-10-30 PROCEDURE — 36415 COLL VENOUS BLD VENIPUNCTURE: CPT | Performed by: FAMILY MEDICINE

## 2024-10-30 PROCEDURE — 25010000002 PROCHLORPERAZINE 10 MG/2ML SOLUTION: Performed by: FAMILY MEDICINE

## 2024-10-30 PROCEDURE — 25010000002 METRONIDAZOLE 500 MG/100ML SOLUTION: Performed by: FAMILY MEDICINE

## 2024-10-30 PROCEDURE — 80307 DRUG TEST PRSMV CHEM ANLYZR: CPT | Performed by: FAMILY MEDICINE

## 2024-10-30 PROCEDURE — 87086 URINE CULTURE/COLONY COUNT: CPT | Performed by: FAMILY MEDICINE

## 2024-10-30 PROCEDURE — 96365 THER/PROPH/DIAG IV INF INIT: CPT

## 2024-10-30 PROCEDURE — 63710000001 INSULIN REGULAR HUMAN PER 5 UNITS: Performed by: FAMILY MEDICINE

## 2024-10-30 PROCEDURE — 74177 CT ABD & PELVIS W/CONTRAST: CPT

## 2024-10-30 PROCEDURE — 25010000002 MORPHINE PER 10 MG: Performed by: FAMILY MEDICINE

## 2024-10-30 PROCEDURE — 25010000002 DICYCLOMINE PER 20 MG: Performed by: FAMILY MEDICINE

## 2024-10-30 PROCEDURE — 85730 THROMBOPLASTIN TIME PARTIAL: CPT | Performed by: FAMILY MEDICINE

## 2024-10-30 PROCEDURE — 82948 REAGENT STRIP/BLOOD GLUCOSE: CPT

## 2024-10-30 PROCEDURE — 87040 BLOOD CULTURE FOR BACTERIA: CPT | Performed by: FAMILY MEDICINE

## 2024-10-30 PROCEDURE — 99285 EMERGENCY DEPT VISIT HI MDM: CPT

## 2024-10-30 PROCEDURE — 85025 COMPLETE CBC W/AUTO DIFF WBC: CPT | Performed by: FAMILY MEDICINE

## 2024-10-30 PROCEDURE — 80053 COMPREHEN METABOLIC PANEL: CPT | Performed by: FAMILY MEDICINE

## 2024-10-30 PROCEDURE — 83605 ASSAY OF LACTIC ACID: CPT | Performed by: FAMILY MEDICINE

## 2024-10-30 PROCEDURE — 87507 IADNA-DNA/RNA PROBE TQ 12-25: CPT | Performed by: FAMILY MEDICINE

## 2024-10-30 PROCEDURE — 85610 PROTHROMBIN TIME: CPT | Performed by: FAMILY MEDICINE

## 2024-10-30 RX ORDER — PROCHLORPERAZINE EDISYLATE 5 MG/ML
10 INJECTION INTRAMUSCULAR; INTRAVENOUS ONCE
Status: COMPLETED | OUTPATIENT
Start: 2024-10-30 | End: 2024-10-30

## 2024-10-30 RX ORDER — METRONIDAZOLE 500 MG/100ML
500 INJECTION, SOLUTION INTRAVENOUS ONCE
Status: COMPLETED | OUTPATIENT
Start: 2024-10-30 | End: 2024-10-30

## 2024-10-30 RX ORDER — PROCHLORPERAZINE MALEATE 10 MG
5 TABLET ORAL EVERY 6 HOURS PRN
Qty: 20 TABLET | Refills: 0 | Status: SHIPPED | OUTPATIENT
Start: 2024-10-30 | End: 2024-11-09

## 2024-10-30 RX ORDER — MORPHINE SULFATE 2 MG/ML
2 INJECTION, SOLUTION INTRAMUSCULAR; INTRAVENOUS ONCE
Status: COMPLETED | OUTPATIENT
Start: 2024-10-30 | End: 2024-10-30

## 2024-10-30 RX ORDER — IOPAMIDOL 612 MG/ML
100 INJECTION, SOLUTION INTRAVASCULAR
Status: COMPLETED | OUTPATIENT
Start: 2024-10-30 | End: 2024-10-30

## 2024-10-30 RX ORDER — SODIUM CHLORIDE 0.9 % (FLUSH) 0.9 %
10 SYRINGE (ML) INJECTION AS NEEDED
Status: DISCONTINUED | OUTPATIENT
Start: 2024-10-30 | End: 2024-10-30 | Stop reason: HOSPADM

## 2024-10-30 RX ORDER — FAMOTIDINE 10 MG/ML
20 INJECTION, SOLUTION INTRAVENOUS ONCE
Status: COMPLETED | OUTPATIENT
Start: 2024-10-30 | End: 2024-10-30

## 2024-10-30 RX ORDER — PROMETHAZINE HYDROCHLORIDE 25 MG/1
25 TABLET ORAL ONCE
Status: COMPLETED | OUTPATIENT
Start: 2024-10-30 | End: 2024-10-30

## 2024-10-30 RX ORDER — METRONIDAZOLE 500 MG/1
500 TABLET ORAL 3 TIMES DAILY
Qty: 21 TABLET | Refills: 0 | Status: SHIPPED | OUTPATIENT
Start: 2024-10-30 | End: 2024-11-06

## 2024-10-30 RX ORDER — DICYCLOMINE HYDROCHLORIDE 10 MG/ML
20 INJECTION INTRAMUSCULAR ONCE
Status: COMPLETED | OUTPATIENT
Start: 2024-10-30 | End: 2024-10-30

## 2024-10-30 RX ORDER — KETOROLAC TROMETHAMINE 30 MG/ML
30 INJECTION, SOLUTION INTRAMUSCULAR; INTRAVENOUS ONCE
Status: DISCONTINUED | OUTPATIENT
Start: 2024-10-30 | End: 2024-10-30

## 2024-10-30 RX ORDER — LEVOFLOXACIN 500 MG/1
500 TABLET, FILM COATED ORAL DAILY
Qty: 7 TABLET | Refills: 0 | Status: SHIPPED | OUTPATIENT
Start: 2024-10-30 | End: 2024-11-06

## 2024-10-30 RX ORDER — ONDANSETRON 2 MG/ML
4 INJECTION INTRAMUSCULAR; INTRAVENOUS ONCE
Status: DISCONTINUED | OUTPATIENT
Start: 2024-10-30 | End: 2024-10-30

## 2024-10-30 RX ADMIN — MORPHINE SULFATE 2 MG: 2 INJECTION, SOLUTION INTRAMUSCULAR; INTRAVENOUS at 08:30

## 2024-10-30 RX ADMIN — PROCHLORPERAZINE EDISYLATE 10 MG: 5 INJECTION INTRAMUSCULAR; INTRAVENOUS at 08:30

## 2024-10-30 RX ADMIN — INSULIN HUMAN 10 UNITS: 100 INJECTION, SOLUTION PARENTERAL at 08:30

## 2024-10-30 RX ADMIN — PROMETHAZINE HYDROCHLORIDE 25 MG: 25 TABLET ORAL at 10:59

## 2024-10-30 RX ADMIN — METRONIDAZOLE 500 MG: 500 INJECTION, SOLUTION INTRAVENOUS at 11:18

## 2024-10-30 RX ADMIN — DICYCLOMINE HYDROCHLORIDE 20 MG: 20 INJECTION INTRAMUSCULAR at 08:03

## 2024-10-30 RX ADMIN — FAMOTIDINE 20 MG: 10 INJECTION INTRAVENOUS at 09:40

## 2024-10-30 RX ADMIN — IOPAMIDOL 100 ML: 612 INJECTION, SOLUTION INTRAVENOUS at 09:26

## 2024-10-30 NOTE — ED PROVIDER NOTES
HPI:     Patient is a 43-year-old white female with known history of diabetes who presents to the emergency room with abdominal pain and leaking from the anus that has been going on for a prolonged period of time.  Patient states that she started having worsening abdominal pain last night 6 out of 10.  Thus she has come here for further evaluation.  There has been no reported fever at home.  No report of exposure to any illness at home.  Asked patient if she has had evaluation by neurology for possible back because of this loss of bowel and bladder control this been present for years patient said no but she does have chronic low back pain.    REVIEW OF SYSTEMS  CONSTITUTIONAL:  No complaints of fever, chills,or weakness  EYES:  No complaints of discharge   ENT: No complaints of sore throat or ear pain  CARDIOVASCULAR:  No complaints of chest pain, palpitations, or swelling  RESPIRATORY:  No complaints of cough or shortness of breath  GI: Positive for nausea vomiting and diarrhea no worse than usual with chronic leaking of stool this been present since 2007.  This has been worsening.  MUSCULOSKELETAL: Positive for chronic low back pain   SKIN:  No complaints of rash  NEUROLOGIC:  No complaints of headache, focal weakness, or sensory changes  ENDOCRINE:  No complaints of polyuria or polydipsia  LYMPHATIC:  No complaints of swollen glands  GENITOURINARY: No complaints of urinary frequency or hematuria      PAST MEDICAL HISTORY  Past Medical History:   Diagnosis Date    Allergic rhinitis     Anxiety     Arthritis     Bipolar disorder     Colitis     Depression     Fibromyalgia     Gallbladder abscess     GERD (gastroesophageal reflux disease)     History of ovarian cancer     OCD (obsessive compulsive disorder)     Peripheral neuropathy     Type 1 diabetes        FAMILY HISTORY  Family History   Problem Relation Age of Onset    Cancer Mother     Diabetes Mother     Cancer Father     Heart disease Father     Lung disease  Father        SOCIAL HISTORY  Social History     Socioeconomic History    Marital status: Single   Tobacco Use    Smoking status: Every Day     Current packs/day: 1.00     Average packs/day: 1 pack/day for 20.0 years (20.0 ttl pk-yrs)     Types: Cigarettes    Smokeless tobacco: Never   Vaping Use    Vaping status: Never Used    Passive vaping exposure: Yes   Substance and Sexual Activity    Alcohol use: No    Drug use: Never    Sexual activity: Yes       IMMUNIZATION HISTORY  Deferred to primary care physician.    SURGICAL HISTORY  Past Surgical History:   Procedure Laterality Date    BREAST CYST EXCISION      CHOLECYSTECTOMY      GROIN ABSCESS INCISION AND DRAINAGE Right 08/17/2018    Procedure: INCISION AND DRAINAGE RIGHT INGUINAL ABSCESS WITH APPLICATION OF WOUND VAC;  Surgeon: Balaji Ochoa MD;  Location: [facility] OR;  Service: General    HYSTERECTOMY         CURRENT MEDICATIONS    Current Facility-Administered Medications:     metroNIDAZOLE (FLAGYL) IVPB 500 mg, 500 mg, Intravenous, Once, Gary Bonilla Jr., MD    promethazine (PHENERGAN) tablet 25 mg, 25 mg, Oral, Once, Gary Bonilla Jr., MD    [COMPLETED] Insert Peripheral IV, , , Once **AND** sodium chloride 0.9 % flush 10 mL, 10 mL, Intravenous, PRN, Gary Bonilla Jr., MD    Current Outpatient Medications:     clonazePAM (KlonoPIN) 1 MG tablet, Take 1 mg by mouth Every 8 (Eight) Hours., Disp: , Rfl:     dicyclomine (BENTYL) 10 MG capsule, Take 10 mg by mouth 3 (Three) Times a Day Before Meals. 3 times daily, Disp: , Rfl:     Insulin Glargine 300 UNIT/ML solution pen-injector, Inject 24 Units under the skin into the appropriate area as directed Every Night., Disp: , Rfl:     insulin lispro (HumaLOG) 100 UNIT/ML injection, Inject  under the skin into the appropriate area as directed 3 (Three) Times a Day Before Meals. Usually takes 4 units before meals but is a sliding scale., Disp: , Rfl:     levoFLOXacin (LEVAQUIN) 500 MG tablet, Take  "1 tablet by mouth Daily for 7 days., Disp: 7 tablet, Rfl: 0    metroNIDAZOLE (FLAGYL) 500 MG tablet, Take 1 tablet by mouth 3 (Three) Times a Day for 7 days., Disp: 21 tablet, Rfl: 0    ondansetron ODT (Zofran ODT) 8 MG disintegrating tablet, Place 1 tablet on the tongue Every 8 (Eight) Hours As Needed for Nausea., Disp: 15 tablet, Rfl: 0    prochlorperazine (COMPAZINE) 10 MG tablet, Take 0.5 tablets by mouth Every 6 (Six) Hours As Needed for Nausea or Vomiting for up to 10 days., Disp: 20 tablet, Rfl: 0    traMADol (ULTRAM) 50 MG tablet, Take 50 mg by mouth Every 6 (Six) Hours As Needed (PRN moderate pain). tid, Disp: , Rfl:     ALLERGIES  Allergies   Allergen Reactions    Cephalexin     Effexor [Venlafaxine] Other (See Comments)     Unknown     Erythromycin Other (See Comments)     Unknown     Penicillins     Sulfa Antibiotics     Cefaclor Hives       ABDOMINAL EXAM    VITAL SIGNS:   /81 (BP Location: Right arm, Patient Position: Sitting)   Pulse 87   Temp 98.5 °F (36.9 °C) (Oral)   Resp 16   Ht 162.6 cm (64\")   Wt 61.7 kg (136 lb)   SpO2 99%   BMI 23.34 kg/m²     Constitutional: Patient is alert and in no distress.  Patient with moderate abdominal discomfort.    ENT: There is a normal pharynx with no acute erythema or exudate and oral mucosa is moist.  Nose is clear with no drainage.  Tympanic membranes intact and non-erythemic    Cardiovascular: S1-S2 regular rate and rhythm. No murmurs, rubs or gallops.  Pulses are equal bilaterally and there is no pitting edema.    Respiratory: Patient is clear to auscultation bilaterally with no wheezing or rhonchi.  Chest wall is nontender.  There are no external lesions on the chest.  There is no crepitance.    Gastrointestinal: No abdominal distention no fluid wave.  There is no rebound or guarding.  There is tenderness diffusely.  Bowel sounds are normoactive in all 4 quadrants.    Genitourinary: Patient is voiding appropriately.    Integument: No acute " lesions noted.  Color appears to be normal.    Omar Coma Scale: Total score 15    Neurological: Patient is alert and oriented x4 and no acute findings noted.  Speech is fluent and cognition is normal.  No evidence of acute CVA.  Cranial nerves II through XII intact.  Patient with normal motor function as well as reflexes and sensation    Psychiatric: Normal affect and mood.            RADIOLOGY/PROCEDURES        CT Abdomen Pelvis With Contrast   Final Result   1. Moderate diffuse thickening of the wall of the entire colon, more   severely in the sigmoid colon and rectum, with enhancement may partly be   due to incomplete distention. There is a probability of acute   nonspecific colitis. Involvement of the entire colon may suggest   inflammatory/infectious process. The celiac and mesenteric arteries are   normal and patent.   2. Normal appendix. Gallbladder is surgically absent.   3. Fatty infiltration of the liver.                                                                   This report was signed and finalized on 10/30/2024 9:46 AM by Dr. Rafat Metzger MD.                 FUTURE APPOINTMENTS     No future appointments.         COURSE & MEDICAL DECISION MAKING       Patient's partial differential diagnosis can include:    gastroenteritis, gastroparesis, GERD, peptic ulcer disease, gastritis, colitis, enteritis, volvulus, intussusception, esophageal spasms, perforated esophagus, perforated peptic ulcer, partial bowel obstruction, bowel obstruction, mesenteric adenitis, mesenteric ischemia, constipation, irritable bowel, diverticulitis, diverticulosis, Crohn's disease, ulcerative colitis, celiac disease and others      Patient with pancolitis on her CT scan.  Otherwise no acute sepsis is noted.  Discussed with the patient results of her laboratory radiological test.  Will repeat her POC glucose after administering her insulin for her elevated blood sugar.  Patient would like a referral outpatient to  gastroenterology which I do believe is important due to the fact that according to the patient herself she has not had any evaluation of her colon with this chronic diarrhea and abdominal cramping.  Patient's white count is 12,000.  No significant signs of sepsis.  Patient's reexamination of her abdomen does still not show an acute abdomen we will place the patient on Levaquin as well as Flagyl.  Will write this as a prescription and give her also Compazine outpatient.  Referral has been placed in the system with the patient's choice of gastroenterologist.    Patient will receive 1 dose of Flagyl prior to leaving.  Discussed the outpatient plan she is acceptable and is ready to go home after IV medications of Flagyl.  Stool sample was ordered however patient states she is unable to give 1 at this time.    Patient's level of risk: Moderate       CRITICAL CARE    CRITICAL CARE: No    CRITICAL CARE TIME: None      Recent Results (from the past 24 hours)   Urinalysis With Culture If Indicated - Urine, Clean Catch    Collection Time: 10/30/24  7:21 AM    Specimen: Urine, Clean Catch   Result Value Ref Range    Color, UA Yellow Yellow, Straw    Appearance, UA Clear Clear    pH, UA 6.0 5.0 - 8.0    Specific Gravity, UA 1.019 1.005 - 1.030    Glucose, UA >=1000 mg/dL (3+) (A) Negative    Ketones, UA Negative Negative    Bilirubin, UA Negative Negative    Blood, UA Trace (A) Negative    Protein, UA Negative Negative    Leuk Esterase, UA Moderate (2+) (A) Negative    Nitrite, UA Negative Negative    Urobilinogen, UA 0.2 E.U./dL 0.2 - 1.0 E.U./dL   Urinalysis, Microscopic Only - Urine, Clean Catch    Collection Time: 10/30/24  7:21 AM    Specimen: Urine, Clean Catch   Result Value Ref Range    RBC, UA 6-10 (A) None Seen, 0-2 /HPF    WBC, UA 21-50 (A) None Seen, 0-2 /HPF    Bacteria, UA Trace (A) None Seen /HPF    Squamous Epithelial Cells, UA 0-2 None Seen, 0-2 /HPF    Hyaline Casts, UA None Seen None Seen /LPF    Methodology  Automated Microscopy    Green Top (Gel)    Collection Time: 10/30/24  7:22 AM   Result Value Ref Range    Extra Tube Hold for add-ons.    Lavender Top    Collection Time: 10/30/24  7:22 AM   Result Value Ref Range    Extra Tube hold for add-on    Red Top    Collection Time: 10/30/24  7:22 AM   Result Value Ref Range    Extra Tube Hold for add-ons.    Gray Top    Collection Time: 10/30/24  7:22 AM   Result Value Ref Range    Extra Tube Hold for add-ons.    Light Blue Top    Collection Time: 10/30/24  7:22 AM   Result Value Ref Range    Extra Tube Hold for add-ons.    Comprehensive Metabolic Panel    Collection Time: 10/30/24  7:22 AM    Specimen: Blood   Result Value Ref Range    Glucose 364 (H) 65 - 99 mg/dL    BUN 11 6 - 20 mg/dL    Creatinine 0.75 0.57 - 1.00 mg/dL    Sodium 138 136 - 145 mmol/L    Potassium 3.9 3.5 - 5.2 mmol/L    Chloride 97 (L) 98 - 107 mmol/L    CO2 27.0 22.0 - 29.0 mmol/L    Calcium 8.8 8.6 - 10.5 mg/dL    Total Protein 6.6 6.0 - 8.5 g/dL    Albumin 4.0 3.5 - 5.2 g/dL    ALT (SGPT) 70 (H) 1 - 33 U/L    AST (SGOT) 53 (H) 1 - 32 U/L    Alkaline Phosphatase 148 (H) 39 - 117 U/L    Total Bilirubin 0.4 0.0 - 1.2 mg/dL    Globulin 2.6 gm/dL    A/G Ratio 1.5 g/dL    BUN/Creatinine Ratio 14.7 7.0 - 25.0    Anion Gap 14.0 5.0 - 15.0 mmol/L    eGFR 100.2 >60.0 mL/min/1.73   Protime-INR    Collection Time: 10/30/24  7:22 AM    Specimen: Blood   Result Value Ref Range    Protime 12.5 11.8 - 14.8 Seconds    INR 0.90 (L) 0.91 - 1.09   aPTT    Collection Time: 10/30/24  7:22 AM    Specimen: Blood   Result Value Ref Range    PTT 26.5 24.5 - 36.0 seconds   Lipase    Collection Time: 10/30/24  7:22 AM    Specimen: Blood   Result Value Ref Range    Lipase 10 (L) 13 - 60 U/L   Lactic Acid, Plasma    Collection Time: 10/30/24  7:22 AM    Specimen: Blood   Result Value Ref Range    Lactate 3.5 (C) 0.5 - 2.0 mmol/L   Magnesium    Collection Time: 10/30/24  7:22 AM    Specimen: Blood   Result Value Ref Range     Magnesium 2.0 1.6 - 2.6 mg/dL   CBC Auto Differential    Collection Time: 10/30/24  7:22 AM    Specimen: Blood   Result Value Ref Range    WBC 12.61 (H) 3.40 - 10.80 10*3/mm3    RBC 4.13 3.77 - 5.28 10*6/mm3    Hemoglobin 12.9 12.0 - 15.9 g/dL    Hematocrit 39.4 34.0 - 46.6 %    MCV 95.4 79.0 - 97.0 fL    MCH 31.2 26.6 - 33.0 pg    MCHC 32.7 31.5 - 35.7 g/dL    RDW 12.9 12.3 - 15.4 %    RDW-SD 45.1 37.0 - 54.0 fl    MPV 10.9 6.0 - 12.0 fL    Platelets 259 140 - 450 10*3/mm3    Neutrophil % 72.6 42.7 - 76.0 %    Lymphocyte % 18.6 (L) 19.6 - 45.3 %    Monocyte % 6.7 5.0 - 12.0 %    Eosinophil % 1.3 0.3 - 6.2 %    Basophil % 0.3 0.0 - 1.5 %    Immature Grans % 0.5 0.0 - 0.5 %    Neutrophils, Absolute 9.17 (H) 1.70 - 7.00 10*3/mm3    Lymphocytes, Absolute 2.34 0.70 - 3.10 10*3/mm3    Monocytes, Absolute 0.84 0.10 - 0.90 10*3/mm3    Eosinophils, Absolute 0.16 0.00 - 0.40 10*3/mm3    Basophils, Absolute 0.04 0.00 - 0.20 10*3/mm3    Immature Grans, Absolute 0.06 (H) 0.00 - 0.05 10*3/mm3    nRBC 0.0 0.0 - 0.2 /100 WBC   Urine Drug Screen - Urine, Clean Catch    Collection Time: 10/30/24  8:49 AM    Specimen: Urine, Clean Catch   Result Value Ref Range    THC, Screen, Urine Negative Negative    Phencyclidine (PCP), Urine Negative Negative    Cocaine Screen, Urine Negative Negative    Methamphetamine, Ur Negative Negative    Opiate Screen Positive (A) Negative    Amphetamine Screen, Urine Negative Negative    Benzodiazepine Screen, Urine Positive (A) Negative    Tricyclic Antidepressants Screen Negative Negative    Methadone Screen, Urine Negative Negative    Barbiturates Screen, Urine Negative Negative    Oxycodone Screen, Urine Negative Negative    Buprenorphine, Screen, Urine Negative Negative   Fentanyl, Urine - Urine, Clean Catch    Collection Time: 10/30/24  8:49 AM    Specimen: Urine, Clean Catch   Result Value Ref Range    Fentanyl, Urine Negative Negative          Old charts were reviewed per [facility] EMR.  Pertinent  details are summarized above.  All laboratory, radiologic, and EKG studies that were performed in the Emergency Department were a necessary part of the evaluation needed to exclude unstable or  emergent medical conditions.     Patient was hemodynamically and neurologically stable in the ED.   Pertinent studies were reviewed as above.     The patient received:  Medications   sodium chloride 0.9 % flush 10 mL (has no administration in time range)   promethazine (PHENERGAN) tablet 25 mg (has no administration in time range)   metroNIDAZOLE (FLAGYL) IVPB 500 mg (has no administration in time range)   famotidine (PEPCID) injection 20 mg (20 mg Intravenous Given 10/30/24 0940)   dicyclomine (BENTYL) injection 20 mg (20 mg Intramuscular Given 10/30/24 0803)   insulin regular (humuLIN R,novoLIN R) injection 10 Units (10 Units Subcutaneous Given 10/30/24 0830)   prochlorperazine (COMPAZINE) injection 10 mg (10 mg Intramuscular Given 10/30/24 0830)   Morphine sulfate (PF) injection 2 mg (2 mg Intramuscular Given 10/30/24 0830)   iopamidol (ISOVUE-300) 61 % injection 100 mL (100 mL Intravenous Given 10/30/24 0926)            Diagnoses that have been ruled out:   None   Diagnoses that are still under consideration:   None   Final diagnoses:   Pancolitis   Hyperglycemia due to diabetes mellitus   Nausea and vomiting, unspecified vomiting type   Diarrhea, unspecified type        FINAL IMPRESSION   Diagnosis Plan   1. Pancolitis        2. Hyperglycemia due to diabetes mellitus        3. Nausea and vomiting, unspecified vomiting type        4. Diarrhea, unspecified type              Gary Bonilla Jr, MD        ED Disposition       None              Dragon disclaimer:  Part of this note may be an electronic transcription/translation of spoken language to printed text using the Dragon Dictation System.     I have reviewed the patient’s prescription history via a prescription monitoring program.  This information is consistent with  my knowledge of the patient’s controlled substance use history.        Gary Bonilla Jr., MD  10/30/24 2469

## 2024-10-31 LAB — BACTERIA SPEC AEROBE CULT: NO GROWTH

## 2024-11-04 LAB
BACTERIA SPEC AEROBE CULT: NORMAL
BACTERIA SPEC AEROBE CULT: NORMAL

## 2024-11-06 RX ORDER — HYDROXYZINE HYDROCHLORIDE 10 MG/1
10 TABLET, FILM COATED ORAL 3 TIMES DAILY PRN
COMMUNITY
End: 2024-11-11

## 2024-11-06 RX ORDER — CHOLESTYRAMINE LIGHT 4 G/5.7G
4 POWDER, FOR SUSPENSION ORAL 2 TIMES DAILY
COMMUNITY
End: 2024-11-11

## 2024-11-06 RX ORDER — CLONIDINE HYDROCHLORIDE 0.1 MG/1
0.1 TABLET ORAL
COMMUNITY
Start: 2024-09-14 | End: 2024-11-11

## 2024-11-06 RX ORDER — INSULIN GLARGINE 100 [IU]/ML
3 INJECTION, SOLUTION SUBCUTANEOUS NIGHTLY
COMMUNITY
End: 2024-11-11

## 2024-11-06 RX ORDER — METHOCARBAMOL 750 MG/1
750 TABLET, FILM COATED ORAL 3 TIMES DAILY PRN
COMMUNITY
End: 2024-11-11

## 2024-11-06 RX ORDER — INSULIN ASPART 100 [IU]/ML
INJECTION, SOLUTION INTRAVENOUS; SUBCUTANEOUS
COMMUNITY
Start: 2024-08-23

## 2024-11-11 ENCOUNTER — OFFICE VISIT (OUTPATIENT)
Dept: GASTROENTEROLOGY | Age: 44
End: 2024-11-11
Payer: MEDICARE

## 2024-11-11 VITALS
BODY MASS INDEX: 22.99 KG/M2 | WEIGHT: 138 LBS | HEART RATE: 100 BPM | OXYGEN SATURATION: 98 % | HEIGHT: 65 IN | SYSTOLIC BLOOD PRESSURE: 115 MMHG | DIASTOLIC BLOOD PRESSURE: 80 MMHG

## 2024-11-11 DIAGNOSIS — R15.9 INCONTINENCE OF FECES, UNSPECIFIED FECAL INCONTINENCE TYPE: ICD-10-CM

## 2024-11-11 DIAGNOSIS — R19.7 DIARRHEA, UNSPECIFIED TYPE: ICD-10-CM

## 2024-11-11 DIAGNOSIS — R19.7 DIARRHEA, UNSPECIFIED TYPE: Primary | ICD-10-CM

## 2024-11-11 DIAGNOSIS — K21.9 GASTROESOPHAGEAL REFLUX DISEASE, UNSPECIFIED WHETHER ESOPHAGITIS PRESENT: ICD-10-CM

## 2024-11-11 DIAGNOSIS — R11.0 NAUSEA: ICD-10-CM

## 2024-11-11 LAB
ALBUMIN SERPL-MCNC: 4.1 G/DL (ref 3.5–5.2)
ALP SERPL-CCNC: 136 U/L (ref 35–104)
ALT SERPL-CCNC: 25 U/L (ref 5–33)
ANION GAP SERPL CALCULATED.3IONS-SCNC: 10 MMOL/L (ref 7–19)
AST SERPL-CCNC: 29 U/L (ref 5–32)
BASOPHILS # BLD: 0.1 K/UL (ref 0–0.2)
BASOPHILS NFR BLD: 0.6 % (ref 0–1)
BILIRUB SERPL-MCNC: 0.2 MG/DL (ref 0.2–1.2)
BUN SERPL-MCNC: 10 MG/DL (ref 6–20)
CALCIUM SERPL-MCNC: 9.1 MG/DL (ref 8.6–10)
CHLORIDE SERPL-SCNC: 93 MMOL/L (ref 98–111)
CO2 SERPL-SCNC: 27 MMOL/L (ref 22–29)
CREAT SERPL-MCNC: 0.7 MG/DL (ref 0.5–0.9)
CRP SERPL HS-MCNC: <0.3 MG/DL (ref 0–0.5)
EOSINOPHIL # BLD: 0.1 K/UL (ref 0–0.6)
EOSINOPHIL NFR BLD: 1.5 % (ref 0–5)
ERYTHROCYTE [DISTWIDTH] IN BLOOD BY AUTOMATED COUNT: 12.8 % (ref 11.5–14.5)
ERYTHROCYTE [SEDIMENTATION RATE] IN BLOOD BY WESTERGREN METHOD: 20 MM/HR (ref 0–20)
GLUCOSE SERPL-MCNC: 487 MG/DL (ref 70–99)
HCT VFR BLD AUTO: 40.4 % (ref 37–47)
HGB BLD-MCNC: 13.2 G/DL (ref 12–16)
IMM GRANULOCYTES # BLD: 0 K/UL
LYMPHOCYTES # BLD: 2.3 K/UL (ref 1.1–4.5)
LYMPHOCYTES NFR BLD: 29.3 % (ref 20–40)
MCH RBC QN AUTO: 31.4 PG (ref 27–31)
MCHC RBC AUTO-ENTMCNC: 32.7 G/DL (ref 33–37)
MCV RBC AUTO: 96 FL (ref 81–99)
MONOCYTES # BLD: 0.5 K/UL (ref 0–0.9)
MONOCYTES NFR BLD: 6.3 % (ref 0–10)
NEUTROPHILS # BLD: 4.9 K/UL (ref 1.5–7.5)
NEUTS SEG NFR BLD: 61.9 % (ref 50–65)
PLATELET # BLD AUTO: 293 K/UL (ref 130–400)
PMV BLD AUTO: 11.3 FL (ref 9.4–12.3)
POTASSIUM SERPL-SCNC: 4.3 MMOL/L (ref 3.5–5)
PROT SERPL-MCNC: 6.8 G/DL (ref 6.4–8.3)
RBC # BLD AUTO: 4.21 M/UL (ref 4.2–5.4)
SODIUM SERPL-SCNC: 130 MMOL/L (ref 136–145)
WBC # BLD AUTO: 8 K/UL (ref 4.8–10.8)

## 2024-11-11 PROCEDURE — 99204 OFFICE O/P NEW MOD 45 MIN: CPT

## 2024-11-11 RX ORDER — PROMETHAZINE HYDROCHLORIDE 25 MG/1
25 TABLET ORAL EVERY 6 HOURS PRN
COMMUNITY

## 2024-11-11 RX ORDER — DICYCLOMINE HYDROCHLORIDE 10 MG/1
20 CAPSULE ORAL 3 TIMES DAILY
Qty: 120 CAPSULE | Refills: 2 | Status: SHIPPED | OUTPATIENT
Start: 2024-11-11

## 2024-11-11 RX ORDER — FAMOTIDINE 20 MG/1
20 TABLET, FILM COATED ORAL PRN
COMMUNITY

## 2024-11-11 RX ORDER — PROCHLORPERAZINE MALEATE 5 MG/1
5 TABLET ORAL EVERY 6 HOURS PRN
COMMUNITY

## 2024-11-11 RX ORDER — INSULIN GLARGINE 100 [IU]/ML
5 INJECTION, SOLUTION SUBCUTANEOUS NIGHTLY
COMMUNITY

## 2024-11-11 ASSESSMENT — ENCOUNTER SYMPTOMS
SHORTNESS OF BREATH: 0
DIARRHEA: 1
NAUSEA: 1
ALLERGIC/IMMUNOLOGIC NEGATIVE: 1
EYES NEGATIVE: 1
BLOOD IN STOOL: 0
ANAL BLEEDING: 0
VOICE CHANGE: 0
VOMITING: 0
ABDOMINAL PAIN: 0
ABDOMINAL DISTENTION: 1
CHOKING: 0
CONSTIPATION: 0
RESPIRATORY NEGATIVE: 1
COUGH: 0
TROUBLE SWALLOWING: 0

## 2024-11-11 NOTE — PATIENT INSTRUCTIONS
confused.     You have new or worse belly pain.     You have a fever.     There is a new or increasing yellow tint to your skin or the whites of your eyes.     You have any abnormal bleeding, such as:  Nosebleeds.  Vaginal bleeding that is different (heavier, more frequent, at a different time of the month) than what you are used to.  Bloody or black stools, or rectal bleeding.  Bloody or pink urine.   Watch closely for changes in your health, and be sure to contact your doctor if:    Your belly is getting bigger.     You are gaining weight.     Your symptoms get worse.   Where can you learn more?  Go to https://www.[employer].net/patientEd and enter F761 to learn more about \"Nonalcoholic Fatty Liver Disease (NAFLD): Care Instructions.\"  Current as of: October 19, 2023  Content Version: 14.2  © 2024 [employer].   Care instructions adapted under license by [employer]. If you have questions about a medical condition or this instruction, always ask your healthcare professional. [employer] disclaims any warranty or liability for your use of this information.       Patient Education        Diarrhea: Care Instructions  Overview     Diarrhea is loose, watery stools (bowel movements). The exact cause is often hard to find. Sometimes diarrhea is your body's way of getting rid of what caused an upset stomach. Viruses, food poisoning, and many medicines can cause diarrhea. Some people get diarrhea in response to emotional stress, anxiety, or certain foods.  Almost everyone has diarrhea now and then. It usually isn't serious, and your stools will return to normal soon. The important thing to do is replace the fluids you have lost, so you can prevent dehydration.  The doctor has checked you carefully, but problems can develop later. If you notice any problems or new symptoms, get medical treatment right away.  Follow-up care is a key part of your treatment and safety. Be sure to make and go to all

## 2024-11-11 NOTE — PROGRESS NOTES
Subjective:     Patient ID: Cheryl Morgan is a 44 y.o. female  PCP: Aj Ayala MD  Referring Provider: Aj Ayala MD    HPI  Patient presents to the office today with the following complaints: New Patient and Diarrhea    Patient seen in the office today. She states about 2 years ago she started having diabetic nocturnal diarrhea. She was started on clonidine and she also took questran light which did not help. Then she was having fecal incontinence. She states she went to the ER on 10/30/2024 where she was diagnosed with pancolitis. She was treated with flagyl and levaquin and the diarrhea got better.  She does have gas, nausea, and bloating. She denies ever having abdominal pain, vomiting, bright red blood or black stools.She states she does have mucus in her stool at times. She states the diarrhea gets worse after eating. She admits to having 2-6 bowel movements daily at times. She also has acid reflux which is controlled with pepcid. She has a history of fatty liver but does not want to address this until the colon is resolved. She agrees to an EGD and Colonoscopy at this time.    CT of abdomen 10/30/2024        Last EGD -never  Last Colonoscopy - never  Denies family hx colon cancer/colon polyps     Time spent with the patient: 45 minutes      Assessment:     1. Diarrhea, unspecified type    2. Incontinence of feces, unspecified fecal incontinence type    3. Nausea    4. Gastroesophageal reflux disease, unspecified whether esophagitis present              Plan:     Lab work today  Follow up in 3 months or sooner if needed  Increase bentyl to 20 mg TID PRN  Schedule Colonoscopy and EGD  Instruct on bowel prep.   Nothing to eat or drink after midnight the day of the exam.  Unable to drive for 24 hours after the procedure.   No aspirin or nonsteroidal anti-inflammatories for 5 days before procedure.  I have discussed the benefits, alternatives, and risks (including bleeding, perforation and death)

## 2024-11-12 ENCOUNTER — TELEPHONE (OUTPATIENT)
Dept: GASTROENTEROLOGY | Age: 44
End: 2024-11-12

## 2024-11-12 RX ORDER — SODIUM CHLORIDE, SODIUM LACTATE, POTASSIUM CHLORIDE, CALCIUM CHLORIDE 600; 310; 30; 20 MG/100ML; MG/100ML; MG/100ML; MG/100ML
INJECTION, SOLUTION INTRAVENOUS CONTINUOUS
Status: CANCELLED | OUTPATIENT
Start: 2024-11-12

## 2024-11-12 NOTE — TELEPHONE ENCOUNTER
----- Message from Melanie Turcios, APRN - CNP sent at 11/11/2024  6:52 PM CST -----  Sodium slightly low, blood glucose also really high at 487, kidney function normal, alkaline phos. Elevated at 136 which can indicate liver issue (possibly due to her fatty liver, which will be addressed later), blood counts stable, sed rate and crp wnl (these would indicate inflammation if increased). Will wait for other results.

## 2024-11-18 ENCOUNTER — ANESTHESIA EVENT (OUTPATIENT)
Dept: OPERATING ROOM | Age: 44
End: 2024-11-18

## 2024-11-19 ENCOUNTER — ANESTHESIA (OUTPATIENT)
Dept: OPERATING ROOM | Age: 44
End: 2024-11-19

## 2024-11-19 ENCOUNTER — HOSPITAL ENCOUNTER (OUTPATIENT)
Age: 44
Setting detail: OUTPATIENT SURGERY
Discharge: HOME | End: 2024-11-19
Attending: INTERNAL MEDICINE | Admitting: INTERNAL MEDICINE
Payer: MEDICARE

## 2024-11-19 ENCOUNTER — APPOINTMENT (OUTPATIENT)
Dept: OPERATING ROOM | Age: 44
End: 2024-11-19
Attending: INTERNAL MEDICINE

## 2024-11-19 ENCOUNTER — TELEPHONE (OUTPATIENT)
Dept: GASTROENTEROLOGY | Age: 44
End: 2024-11-19

## 2024-11-19 ENCOUNTER — HOSPITAL ENCOUNTER (OUTPATIENT)
Age: 44
Setting detail: SPECIMEN
Discharge: HOME | End: 2024-11-19
Payer: MEDICARE

## 2024-11-19 VITALS
HEART RATE: 94 BPM | BODY MASS INDEX: 22.99 KG/M2 | OXYGEN SATURATION: 97 % | TEMPERATURE: 97.6 F | SYSTOLIC BLOOD PRESSURE: 114 MMHG | HEIGHT: 65 IN | DIASTOLIC BLOOD PRESSURE: 72 MMHG | RESPIRATION RATE: 18 BRPM | WEIGHT: 138 LBS

## 2024-11-19 PROCEDURE — 45380 COLONOSCOPY AND BIOPSY: CPT | Performed by: INTERNAL MEDICINE

## 2024-11-19 PROCEDURE — 45380 COLONOSCOPY AND BIOPSY: CPT

## 2024-11-19 PROCEDURE — G8907 PT DOC NO EVENTS ON DISCHARG: HCPCS

## 2024-11-19 PROCEDURE — 45385 COLONOSCOPY W/LESION REMOVAL: CPT | Performed by: INTERNAL MEDICINE

## 2024-11-19 PROCEDURE — 43239 EGD BIOPSY SINGLE/MULTIPLE: CPT

## 2024-11-19 PROCEDURE — 43239 EGD BIOPSY SINGLE/MULTIPLE: CPT | Performed by: INTERNAL MEDICINE

## 2024-11-19 PROCEDURE — 45385 COLONOSCOPY W/LESION REMOVAL: CPT

## 2024-11-19 PROCEDURE — 88305 TISSUE EXAM BY PATHOLOGIST: CPT

## 2024-11-19 PROCEDURE — 45384 COLONOSCOPY W/LESION REMOVAL: CPT

## 2024-11-19 PROCEDURE — 45388 COLONOSCOPY W/ABLATION: CPT | Performed by: INTERNAL MEDICINE

## 2024-11-19 PROCEDURE — G8917 PT W IV AB NOT GIVEN ON TIME: HCPCS

## 2024-11-19 RX ORDER — LIDOCAINE HYDROCHLORIDE 10 MG/ML
INJECTION, SOLUTION EPIDURAL; INFILTRATION; INTRACAUDAL; PERINEURAL
Status: DISCONTINUED | OUTPATIENT
Start: 2024-11-19 | End: 2024-11-19 | Stop reason: SDUPTHER

## 2024-11-19 RX ORDER — PROPOFOL 10 MG/ML
INJECTION, EMULSION INTRAVENOUS
Status: DISCONTINUED | OUTPATIENT
Start: 2024-11-19 | End: 2024-11-19 | Stop reason: SDUPTHER

## 2024-11-19 RX ORDER — ONDANSETRON 2 MG/ML
4 INJECTION INTRAMUSCULAR; INTRAVENOUS ONCE
Status: COMPLETED | OUTPATIENT
Start: 2024-11-19 | End: 2024-11-19

## 2024-11-19 RX ORDER — SODIUM CHLORIDE 9 MG/ML
INJECTION, SOLUTION INTRAVENOUS CONTINUOUS
Status: DISCONTINUED | OUTPATIENT
Start: 2024-11-19 | End: 2024-11-19 | Stop reason: HOSPADM

## 2024-11-19 RX ADMIN — ONDANSETRON 4 MG: 2 INJECTION INTRAMUSCULAR; INTRAVENOUS at 10:28

## 2024-11-19 RX ADMIN — SODIUM CHLORIDE: 9 INJECTION, SOLUTION INTRAVENOUS at 10:04

## 2024-11-19 RX ADMIN — PROPOFOL 410 MG: 10 INJECTION, EMULSION INTRAVENOUS at 11:12

## 2024-11-19 RX ADMIN — LIDOCAINE HYDROCHLORIDE 60 MG: 10 INJECTION, SOLUTION EPIDURAL; INFILTRATION; INTRACAUDAL; PERINEURAL at 11:07

## 2024-11-19 ASSESSMENT — PAIN - FUNCTIONAL ASSESSMENT
PAIN_FUNCTIONAL_ASSESSMENT: NONE - DENIES PAIN
PAIN_FUNCTIONAL_ASSESSMENT: 0-10

## 2024-11-19 NOTE — OP NOTE
Endoscopic Procedure Note    Patient: Cheryl Morgan : 1980  Med Rec#: [id number] Acc#: [id number]     Primary Care Provider Aj Ayala MD    Endoscopist: Halley Rodriguez MD, MD    Date of Procedure:  2024    Procedure:   EGD with    Cold biopsies    Indications:   For both EGD and colonoscopy exams today:    1. Diarrhea, chronic-suspected diabetic diarrhea but without much improvement despite treatment   2. Incontinence of feces, unspecified fecal incontinence type    3. Nausea    4. Gastroesophageal reflux disease, unspecified whether esophagitis present    5.  Abnormal CT scan of abdomen and pelvis with suspected pancolitis  6.  Gas and bloating  7.  History of suboptimally controlled diabetes-with blood sugars in the 400s this morning and patient's A1c between 8 and 10 for the last year    Anesthesia:  Sedation was administered by anesthesia who monitored the patient during the procedure.    Estimated Blood Loss: minimal    Procedure:   After reviewing the patient's chart and obtaining informed consent, the patient was placed in the left lateral decubitus position.  A forward-viewing Olympus endoscope was lubricated and inserted through the mouth into the oropharynx. Under direct visualization, the upper esophagus was intubated. The scope was advanced to the level of the third portion of duodenum. Scope was slowly withdrawn with careful inspection of the mucosal surfaces. The scope was retroflexed for inspection of the gastric fundus and incisura. Findings and maneuvers are listed in impression below. The patient tolerated the procedure well. The scope was removed. There were no immediate complications.    Findings/IMPRESSION:  Esophagus: normal and a normal EG junction at 38 cm.    NO erosions or ulcers or nodules or strictures or webs or rings or mass lesions or extrinsic compression or diverticula noted.     There is no obvious hiatal hernia present.      Stomach:  abnormal:

## 2024-11-19 NOTE — OP NOTE
Patient: Cheryl Morgan : 1980  Med Rec#: [id number] Acc#: [id number]   Primary Care Provider Aj Ayala MD    Date of Procedure:  2024    Endoscopist: Halley Rodriguez MD, MD    Referring Provider: Aj Ayala MD,     Operation Performed: Colonoscopy up to the distal terminal ileum   with     Hot snare resection of a cecal polyp,  Hot cautery ablation of a diminutive rectal polyp and  Random colon cold biopsies    Indications: For both EGD and colonoscopy exams today:    1. Diarrhea, chronic-suspected diabetic diarrhea but without much improvement despite treatment   2. Incontinence of feces, unspecified fecal incontinence type    3. Nausea    4. Gastroesophageal reflux disease, unspecified whether esophagitis present    5.  Abnormal CT scan of abdomen and pelvis with suspected pancolitis  6.  Gas and bloating  7.  History of suboptimally controlled diabetes-with blood sugars in the 400s this morning and patient's A1c between 8 and 10 for the last year     Anesthesia:  Sedation was administered by anesthesia who monitored the patient during the procedure.    I met with Cheryl Morgan prior to procedure. We discussed the procedure itself, and I have discussed the risks of endoscopy (including-- but not limited to-- pain, discomfort, bleeding potentially requiring second endoscopic procedure and/or blood transfusion, organ perforation requiring operative repair, damage to organs near the colon, infection, aspiration, cardiopulmonary/allergic reaction), benefits, indications to endoscopy. Additionally, we discussed options other than colonoscopy. The patient expressed understanding. All questions answered. The patient decided to proceed with the procedure.  Signed informed consent was placed on the chart.    Blood Loss: minimal    Withdrawal time: More than 10 minutes  Bowel Prep: Fair to poor despite her history of chronic diarrhea with thick semisolid stool scattered in

## 2024-11-19 NOTE — ANESTHESIA POSTPROCEDURE EVALUATION
Department of Anesthesiology  Postprocedure Note    Patient: Cheryl Morgan  MRN: [id number]  YOB: 1980  Date of evaluation: 11/19/2024    Procedure Summary       Date: 11/19/24 Room / Location: [place] [address] / [facility] [place]    Anesthesia Start: 1100 Anesthesia Stop: 1139    Procedures:       ESOPHAGOGASTRODUODENOSCOPY BIOPSY (Esophagus)      COLONOSCOPY POLYPECTOMY REMOVAL SNARE/STOMA (Abdomen) Diagnosis:       Gastroesophageal reflux disease, unspecified whether esophagitis present      Nausea      Diarrhea, unspecified type      Fecal incontinence with incomplete defecation      (Gastroesophageal reflux disease, unspecified whether esophagitis present [K21.9])      (Nausea [R11.0])      (Diarrhea, unspecified type [R19.7])      (Fecal incontinence with incomplete defecation [R15.9, R15.0])    Surgeons: Halley Rodriguez MD Responsible Provider: Kalpesh Solano APRN - CRNA    Anesthesia Type: general, TIVA ASA Status: 2            Anesthesia Type: No value filed.    Mai Phase I:      Mai Phase II:      Anesthesia Post Evaluation    Patient location during evaluation: bedside  Patient participation: complete - patient participated  Level of consciousness: sleepy but conscious  Pain score: 0  Airway patency: patent  Nausea & Vomiting: no nausea and no vomiting  Cardiovascular status: hemodynamically stable  Respiratory status: acceptable, spontaneous ventilation, nonlabored ventilation and room air  Hydration status: stable  Comments: BP (!) 96/58   Pulse 91   Temp 98.1 °F (36.7 °C) (Temporal)   Resp 15   Ht 1.651 m (5' 5\")   Wt 62.6 kg (138 lb)   SpO2 100%   BMI 22.96 kg/m²     Pain management: adequate    No notable events documented.

## 2024-11-19 NOTE — ANESTHESIA PRE PROCEDURE
Department of Anesthesiology  Preprocedure Note       Name:  Cheryl Morgan   Age:  44 y.o.  :  1980                                          MRN:  [id number]         Date:  2024      Surgeon: Surgeon(s):  Halley Rodriguez MD    Procedure: Procedure(s):  ESOPHAGOGASTRODUODENOSCOPY BIOPSY  COLONOSCOPY DIAGNOSTIC    Medications prior to admission:   Prior to Admission medications    Medication Sig Start Date End Date Taking? Authorizing Provider   clonazePAM (KLONOPIN) 1 MG tablet Take 1 tablet by mouth three times daily.   Yes Jose Javier MD   insulin glargine (LANTUS) 100 UNIT/ML injection vial Inject 5 Units into the skin nightly    Jose Javier MD   promethazine (PHENERGAN) 25 MG tablet Take 1 tablet by mouth every 6 hours as needed for Nausea    Jose Javier MD   prochlorperazine (COMPAZINE) 5 MG tablet Take 1 tablet by mouth every 6 hours as needed for Nausea    Jose Javier MD   famotidine (PEPCID) 20 MG tablet Take 1 tablet by mouth as needed    Jose Javier MD   dicyclomine (BENTYL) 10 MG capsule Take 2 capsules by mouth in the morning, at noon, and at bedtime 24   Melanie Turcios APRN - CNP   Ergocalciferol (VITAMIN D2 PO) Take 5,000 Units by mouth daily    Jose Javier MD   NOVOLOG FLEXPEN 100 UNIT/ML injection pen Inject into the skin 3 times daily (before meals) 24   Jose Javier MD   ondansetron (ZOFRAN ODT) 4 MG disintegrating tablet Take 1 tablet by mouth every 8 hours as needed for Nausea or Vomiting 19   Elgin Villa MD   DULoxetine (CYMBALTA) 60 MG extended release capsule Take 1 capsule by mouth daily 19   Jose Javier MD   traMADol (ULTRAM) 50 MG tablet Take 1 tablet by mouth in the morning, at noon, and at bedtime. Indications: Pain    Aj Ayala MD       Current medications:    Current Facility-Administered Medications   Medication Dose Route Frequency Provider Last

## 2024-11-19 NOTE — DISCHARGE INSTRUCTIONS
Lodine  Daypro                 Nalfon  Toradol                Ansaid  Feldene               Meclofenamate  Fenoprofen          Ponstel  Mobic                   Celebrex  Vioxx

## 2024-11-19 NOTE — H&P
Patient Name: Cheryl Morgan  : 1980  MRN: [id number]  DATE: 24    Allergies:   Allergies   Allergen Reactions    Ceclor [Cefaclor] Hives    Amoxicillin     Keflex [Cephalexin]     Penicillins         ENDOSCOPY  History and Physical    Procedure:    [x] Diagnostic Colonoscopy       [] Screening Colonoscopy  [x] EGD      [] ERCP      [] EUS       [] Other    [x] Previous office notes/History and Physical reviewed from the patients chart. Please see EMR for further details of HPI. I have examined the patient's status immediately prior to the procedure and:      Indications/HPI:     For both EGD and colonoscopy exams today:    1. Diarrhea, chronic-suspected diabetic diarrhea but without much improvement despite treatment   2. Incontinence of feces, unspecified fecal incontinence type    3. Nausea    4. Gastroesophageal reflux disease, unspecified whether esophagitis present    5.  Abnormal CT scan of abdomen and pelvis with suspected pancolitis  6.  Gas and bloating      []Abdominal Pain   []Cancer- GI/Lung     []Fhx of colon CA/polyps  []History of Polyps  []Barretts            []Melena  []Abnormal Imaging              []Dysphagia              []Persistent Pneumonia   []Anemia                            []Food Impaction        []History of Polyps  [] GI Bleed             []Pulmonary nodule/Mass   []Change in bowel habits []Heartburn/Reflux  []Rectal Bleed (BRBPR)  []Chest Pain - Non Cardiac []Heme (+) Stool []Ulcers  []Constipation  []Hemoptysis  []Varices  []Diarrhea  []Hypoxemia    []Nausea/Vomiting   []Screening   []Crohns/Colitis  []Other:     Anesthesia:   [x] MAC [] Moderate Sedation   [] General   [] None     ROS: 12 pt Review of Symptoms was negative unless mentioned above    Medications:   Prior to Admission medications    Medication Sig Start Date End Date Taking? Authorizing Provider   clonazePAM (KLONOPIN) 1 MG tablet Take 1 tablet by mouth three times daily.   Yes Provider, MD Jose

## 2024-11-22 RX ORDER — OMEPRAZOLE 40 MG/1
40 CAPSULE, DELAYED RELEASE ORAL DAILY
Qty: 30 CAPSULE | Refills: 11 | Status: SHIPPED | OUTPATIENT
Start: 2024-11-22

## 2024-11-27 ENCOUNTER — TELEPHONE (OUTPATIENT)
Dept: GASTROENTEROLOGY | Age: 44
End: 2024-11-27

## 2024-11-27 DIAGNOSIS — R93.3 ABNORMAL ENDOSCOPY OF UPPER GASTROINTESTINAL TRACT: Primary | ICD-10-CM

## 2024-11-27 NOTE — TELEPHONE ENCOUNTER
11.27.24  Per Dr Rodriguez op note EGD recommendations                      Tried to call patient to let her know that Dr Rodriguez is wanting patient to get gastric emptying study schedule. Left phone number for scheduling department for patient. Said if had any questions to give us a call back

## 2024-12-04 DIAGNOSIS — E13.43 GASTROPARESIS DUE TO SECONDARY DIABETES (HCC): Primary | ICD-10-CM

## 2024-12-04 RX ORDER — METOCLOPRAMIDE 10 MG/1
10 TABLET ORAL 4 TIMES DAILY
Qty: 120 TABLET | Refills: 3 | Status: SHIPPED | OUTPATIENT
Start: 2024-12-04

## 2024-12-30 DIAGNOSIS — R11.0 NAUSEA: Primary | ICD-10-CM

## 2024-12-30 RX ORDER — ONDANSETRON 4 MG/1
4 TABLET, ORALLY DISINTEGRATING ORAL EVERY 8 HOURS PRN
Qty: 15 TABLET | Refills: 1 | Status: SHIPPED | OUTPATIENT
Start: 2024-12-30

## 2025-02-13 ENCOUNTER — TELEPHONE (OUTPATIENT)
Dept: GASTROENTEROLOGY | Age: 45
End: 2025-02-13

## 2025-02-14 DIAGNOSIS — R19.7 DIARRHEA, UNSPECIFIED TYPE: ICD-10-CM

## 2025-02-14 RX ORDER — DICYCLOMINE HCL 20 MG
20 TABLET ORAL 4 TIMES DAILY PRN
Qty: 360 TABLET | Refills: 3 | Status: SHIPPED | OUTPATIENT
Start: 2025-02-14

## 2025-07-21 ENCOUNTER — TELEPHONE (OUTPATIENT)
Dept: GASTROENTEROLOGY | Age: 45
End: 2025-07-21

## 2025-07-21 DIAGNOSIS — R11.0 NAUSEA: ICD-10-CM

## 2025-07-21 DIAGNOSIS — K21.9 GASTROESOPHAGEAL REFLUX DISEASE WITHOUT ESOPHAGITIS: Primary | ICD-10-CM

## 2025-07-21 RX ORDER — OMEPRAZOLE 40 MG/1
40 CAPSULE, DELAYED RELEASE ORAL DAILY
Qty: 90 CAPSULE | Refills: 3 | Status: SHIPPED | OUTPATIENT
Start: 2025-07-21

## 2025-07-21 NOTE — TELEPHONE ENCOUNTER
07- [facility] [address] [facility] requested a refill on Omeprazole         Last rxd on 11-           Called [facility] Spoke with Radha Pharmacist   Notified rx on 11- w 11 refills.  She stated that the insurance is requiring a 90 day rx       Routed to LT APRN

## 2025-07-29 ENCOUNTER — TELEPHONE (OUTPATIENT)
Dept: GASTROENTEROLOGY | Age: 45
End: 2025-07-29

## 2025-07-29 NOTE — TELEPHONE ENCOUNTER
07- pt called stated that she was diagnosed with Gastroparesis.  She would for LT APRN to prescribe her Gimoti.       Discussed with LT APRN recommend that the patient schedule an apt to discuss       Called notified patient of recommendation apt scheduled for 07-

## 2025-07-30 ENCOUNTER — OFFICE VISIT (OUTPATIENT)
Dept: GASTROENTEROLOGY | Age: 45
End: 2025-07-30
Payer: MEDICARE

## 2025-07-30 VITALS
WEIGHT: 129.2 LBS | HEART RATE: 86 BPM | SYSTOLIC BLOOD PRESSURE: 124 MMHG | OXYGEN SATURATION: 98 % | DIASTOLIC BLOOD PRESSURE: 74 MMHG | BODY MASS INDEX: 21.5 KG/M2

## 2025-07-30 DIAGNOSIS — R11.0 NAUSEA: ICD-10-CM

## 2025-07-30 DIAGNOSIS — R15.9 FULL INCONTINENCE OF FECES: Primary | ICD-10-CM

## 2025-07-30 DIAGNOSIS — K63.5 CECAL POLYP: ICD-10-CM

## 2025-07-30 DIAGNOSIS — K58.0 IRRITABLE BOWEL SYNDROME WITH DIARRHEA: ICD-10-CM

## 2025-07-30 DIAGNOSIS — K29.80 DUODENITIS: ICD-10-CM

## 2025-07-30 PROCEDURE — G8427 DOCREV CUR MEDS BY ELIG CLIN: HCPCS

## 2025-07-30 PROCEDURE — 4004F PT TOBACCO SCREEN RCVD TLK: CPT

## 2025-07-30 PROCEDURE — 99214 OFFICE O/P EST MOD 30 MIN: CPT

## 2025-07-30 PROCEDURE — G8420 CALC BMI NORM PARAMETERS: HCPCS

## 2025-07-30 RX ORDER — L.ACID/L.CASEI/B.BIF/B.LON/FOS 2B CELL-50
1 CAPSULE ORAL DAILY
Qty: 30 CAPSULE | Refills: 11 | Status: SHIPPED | OUTPATIENT
Start: 2025-07-30

## 2025-07-30 RX ORDER — COLESTIPOL HYDROCHLORIDE 1 G/1
1 TABLET ORAL 2 TIMES DAILY
Qty: 60 TABLET | Refills: 11 | Status: SHIPPED | OUTPATIENT
Start: 2025-07-30

## 2025-08-05 PROBLEM — K31.84 GASTROPARESIS: Status: RESOLVED | Noted: 2025-08-05 | Resolved: 2025-08-05

## 2025-08-05 PROBLEM — R19.7 DIARRHEA: Status: ACTIVE | Noted: 2025-08-05

## 2025-08-05 PROBLEM — K31.84 GASTROPARESIS: Status: ACTIVE | Noted: 2025-08-05

## 2025-08-05 PROBLEM — R15.9 FULL INCONTINENCE OF FECES: Status: ACTIVE | Noted: 2025-08-05

## 2025-08-05 ASSESSMENT — ENCOUNTER SYMPTOMS
ANAL BLEEDING: 0
BLOOD IN STOOL: 0

## (undated) DEVICE — 3M™ IOBAN™ 2 ANTIMICROBIAL INCISE DRAPE 6650EZ: Brand: IOBAN™ 2

## (undated) DEVICE — SUT VIC 3/0 RB1 27IN UD VCP215H

## (undated) DEVICE — TRAP,MUCUS SPECIMEN,40CC: Brand: MEDLINE

## (undated) DEVICE — SPNG GZ WOVN 4X4IN 12PLY 10/BX STRL

## (undated) DEVICE — UTILITY MARKER W/MED LABELS: Brand: MEDLINE

## (undated) DEVICE — KT CANSTR VAC WND W/ISOLYSER SENSATRAC 500CC 10CS

## (undated) DEVICE — SHEET, T, LAPAROTOMY, STERILE: Brand: MEDLINE

## (undated) DEVICE — SYR CONTRL LUERLOK 10CC

## (undated) DEVICE — SUT VIC 3/0 SUTUPAK TIES 18IN J910T

## (undated) DEVICE — BRUSH ENDOSCP 2 END CHN HEDGEHOG

## (undated) DEVICE — KT DRSNG VAC SIMPLACE SM 5PK

## (undated) DEVICE — ENDO KIT,LOURDES HOSPITAL: Brand: MEDLINE INDUSTRIES, INC.

## (undated) DEVICE — 3M™ STERI-STRIP™ REINFORCED ADHESIVE SKIN CLOSURES, R1546, 1/4 IN X 4 IN (6 MM X 100 MM), 10 STRIPS/ENVELOPE: Brand: 3M™ STERI-STRIP™

## (undated) DEVICE — PAD GRND REM POLYHESIVE A/ DISP

## (undated) DEVICE — SINGLE PORT MANIFOLD: Brand: NEPTUNE 2

## (undated) DEVICE — GLV SURG TRIUMPH MICRO PF LTX 7.5 STRL

## (undated) DEVICE — PK TURNOVER RM ADV

## (undated) DEVICE — SUT VIC 4/0 P3 18IN UD VCP494H

## (undated) DEVICE — SNARE POLYP SM W13MMXL240CM SHTH DIA2.4MM OVL FLX DISP

## (undated) DEVICE — CLEANING SPONGE: Brand: KOALA™

## (undated) DEVICE — CANNULA NSL AD L7FT DIV O2 CO2 W/ M LUERLOCK TRMPT CONN

## (undated) DEVICE — PAD MINOR UNIVERSAL: Brand: MEDLINE INDUSTRIES, INC.

## (undated) DEVICE — FORCEPS BX 240CM 2.4MM L NDL RAD JAW 4 M00513334

## (undated) DEVICE — ADHS LIQ MASTISOL 2/3ML

## (undated) DEVICE — SUPPLEMENT DIGESTIVE H2O SOL GI-EASE

## (undated) DEVICE — ELECTRD BLD EDGE/INSUL1P 2.4X5.1MM STRL

## (undated) DEVICE — CLEANING BRUSH - SINGLE USE: Brand: SAFEGUIDE®

## (undated) DEVICE — TRY PREP SCRB VAG PVP

## (undated) DEVICE — BITE BLOCK ENDOSCP AD 60 FR W/ ADJ STRP PLAS GRN BLOX

## (undated) DEVICE — ADAPTER CLEANING PORPOISE CLEANING